# Patient Record
Sex: FEMALE | Race: WHITE | NOT HISPANIC OR LATINO | Employment: UNEMPLOYED | URBAN - METROPOLITAN AREA
[De-identification: names, ages, dates, MRNs, and addresses within clinical notes are randomized per-mention and may not be internally consistent; named-entity substitution may affect disease eponyms.]

---

## 2017-01-11 ENCOUNTER — ALLSCRIPTS OFFICE VISIT (OUTPATIENT)
Dept: OTHER | Facility: OTHER | Age: 37
End: 2017-01-11

## 2017-01-11 LAB — HCG, QUALITATIVE (HISTORICAL): NEGATIVE

## 2017-01-18 ENCOUNTER — GENERIC CONVERSION - ENCOUNTER (OUTPATIENT)
Dept: OTHER | Facility: OTHER | Age: 37
End: 2017-01-18

## 2017-01-19 ENCOUNTER — ALLSCRIPTS OFFICE VISIT (OUTPATIENT)
Dept: OTHER | Facility: OTHER | Age: 37
End: 2017-01-19

## 2017-03-06 ENCOUNTER — ALLSCRIPTS OFFICE VISIT (OUTPATIENT)
Dept: OTHER | Facility: OTHER | Age: 37
End: 2017-03-06

## 2017-03-06 LAB
BACTERIA UR QL AUTO: NEGATIVE
CLUE CELL (HISTORICAL): NORMAL
HYPHAL YEAST (HISTORICAL): NEGATIVE
KOH PREP (HISTORICAL): NORMAL
PH UR STRIP.AUTO: 4 [PH]
TRICHOMONAS (HISTORICAL): NEGATIVE
YEAST (HISTORICAL): NEGATIVE

## 2017-03-08 LAB — CLINICAL COMMENT (HISTORICAL): NORMAL

## 2017-03-22 ENCOUNTER — ALLSCRIPTS OFFICE VISIT (OUTPATIENT)
Dept: OTHER | Facility: OTHER | Age: 37
End: 2017-03-22

## 2018-01-11 NOTE — MISCELLANEOUS
Message  telephone call from patient  States she has had bartholins cyst in past  Thought she was getting another  Completing soaks without improvement  Now describing a "ball with a scab" on labia  Appt  given to evaluate      Active Problems    1  Anemia (285 9) (D64 9)   2  Anemia, normocytic normochromic (285 9) (D64 9)   3  Anxiety (300 00) (F41 9)   4  Constipation (564 00) (K59 00)   5  Contraception (V25 9) (Z30 9)   6  Encounter for initial prescription of injectable contraceptive (V25 02) (Z30 013)   7  Encounter for smoking cessation counseling (V65 42,305 1) (Z71 6,Z72 0)   8  Pregnancy (V22 2) (Z33 1)   9  Tobacco use disorder complicating pregnancy, childbirth, or the puerperium, antepartum   condition or complication (679 53) (L14 367)   10  Ultrasound for  screening for fetal growth restriction (V28 4) (Z36)   11  Underweight (783 22) (R63 6)   12  Urinary tract infection (599 0) (N39 0)    Current Meds   1  MedroxyPROGESTERone Acetate 150 MG/ML Intramuscular Suspension   (Depo-Provera); INJECT EVERY 12 WEEKS AS DIRECTED; Therapy: 07Art2152 to (Last Rx:[id number])  Requested for: 55Vus9414   Ordered   2  Sertraline HCl - 50 MG Oral Tablet (Zoloft); TAKE 1 TABLET DAILY; Last Rx:[id number]   Ordered    Allergies    1  Amoxicillin CAPS   2  Penicillins   3  Vicodin TABS    4  No Known Environmental Allergies   5   No Known Food Allergies    Signatures   Electronically signed by : George Haines RN; 2017  1:34PM EST                       (Author)

## 2018-01-13 VITALS
DIASTOLIC BLOOD PRESSURE: 93 MMHG | SYSTOLIC BLOOD PRESSURE: 127 MMHG | HEART RATE: 79 BPM | BODY MASS INDEX: 17.66 KG/M2 | HEIGHT: 62 IN | WEIGHT: 96 LBS

## 2018-01-14 VITALS
HEART RATE: 78 BPM | BODY MASS INDEX: 17.3 KG/M2 | DIASTOLIC BLOOD PRESSURE: 75 MMHG | SYSTOLIC BLOOD PRESSURE: 107 MMHG | HEIGHT: 62 IN | WEIGHT: 94 LBS

## 2018-01-17 NOTE — PROGRESS NOTES
Assessment    1  Contraception () (Z30 9)    Plan  Contraception    · Belen 0 35 MG Oral Tablet; take one tablet daily at the same time   Rx By: Jumana Jaramillo; Dispense: 0 Days ; #:1 Tablet; Refill: 1; For: Contraception; ERI = N; Verified Transmission to [address]; Last Updated By: System, [employer]; 3/22/2017 11:47:55 AM    Discussion/Summary  Discussion Summary:   29 yo  desires to change from depo to pills, is a smoker will place on POP's  Belen take 1 pill daily at the same time, 1 pack with 1 RF  Reviewed irregular bleeding pattern with POP's  Will start this , reviewed not as effective as Depo and QUIRINO's, may use condoms for BUM  RTO for annual exam, has hx of abnormal pap with no colpo, needs repap  Chief Complaint  Chief Complaint Free Text Note Form: Pt here today to discuss BC change  Pt currently on depo and would like to change to OCP  History of Present Illness  HPI: 29 yo  desires to switch contraception from Depo to pills  She feels she feels tired with Depo and had a lot of fatigue the 1st week after getting her 1st Depo injection 2017  She is due 3/29/2017  Currently she feels some fatigue  She feels she is having her menses start soon  She was tested for GC/CT at last visit which both were negative  She has had same partner since 2016  She is a smoker  We reviewed IUD's, POP's, Depo  She would like to use POP's she is hoping she will feel less fatigued but we reviewed since it is progesterone product she may not, may also be related to her life stresses  Review of Systems   Female ROS: no pelvic pain  Active Problems    1  Anxiety (300 00) (F41 9)   2  ASCUS with positive high risk HPV cervical (795 01,795 05) (R87 610,R87 810)   3  Contraception ( 9) (Z30 9)   4  Encounter for initial prescription of injectable contraceptive ( 02) (Z30 013)   5  Encounter for smoking cessation counseling (B45 42,305  1) (Z71 6,Z72 0)   6  Screening for STDs (sexually transmitted diseases) (V74 5) (Z11 3)   7  Ultrasound for  screening for fetal growth restriction (V28 4) (Z36)   8  Underweight (783 22) (R63 6)   9  Vaginal discharge (623 5) (N89 8)    Past Medical History    1  History of Anxiety disorder (300 00) (F41 9)   2  History of normocytic normochromic anemia (V12 3) (Z86 2)   3  History of pregnancy (V13 29)   4  Normal delivery (650) (O80,Z37 9)   5  History of Reported Pap Smear   6  History of Tobacco use disorder complicating pregnancy, childbirth, or the puerperium,   antepartum condition or complication (656 00) (I66 920)   7  History of Vulvar lump (625 8) (N90 9)    Surgical History    1  History of Cervical Loop Electrosurgical Excision (LEEP)   2  History of Dental Surgery    Family History  Mother    1  No pertinent family history  Father    2  No pertinent family history  Maternal Grandfather    3  Family history of Diabetes Mellitus (V18 0)  Family History    4  Family history of Breast Cancer (V16 3)    Social History    · Denied: History of Alcohol Use (History)   · Current every day smoker (305 1) (F17 200)   · Denied: History of Drug use    Current Meds   1  MedroxyPROGESTERone Acetate 150 MG/ML Intramuscular Suspension; INJECT   EVERY 12 WEEKS AS DIRECTED; Therapy: 49Psa2197 to (Last Rx:[id number])  Requested for: 76Iqb1314 Ordered   2  Sertraline HCl - 50 MG Oral Tablet; TAKE 1 TABLET DAILY; Last Rx:2015 Ordered    Allergies    1  Amoxicillin CAPS   2  Penicillins   3  Vicodin TABS    4  No Known Environmental Allergies   5  No Known Food Allergies    Vitals  Vital Signs    Recorded: 47IUH1677 42:44BF   Systolic 075, LUE, Sitting   Diastolic 79, LUE, Sitting   Height 5 ft 2 in   Weight 96 lb    BMI Calculated 17 56   BSA Calculated 1 4     Physical Exam    Constitutional   General appearance: No acute distress, well appearing and well nourished      Psychiatric   Orientation to person, place, and time: Normal     Mood and affect: Normal        Signatures   Electronically signed by : SINA Rodriguez; Mar 22 2017 12:02PM EST                       (Author)

## 2018-01-22 VITALS
DIASTOLIC BLOOD PRESSURE: 79 MMHG | SYSTOLIC BLOOD PRESSURE: 122 MMHG | HEIGHT: 62 IN | BODY MASS INDEX: 17.66 KG/M2 | WEIGHT: 96 LBS

## 2018-01-30 ENCOUNTER — INITIAL PRENATAL (OUTPATIENT)
Dept: OBGYN CLINIC | Facility: CLINIC | Age: 38
End: 2018-01-30

## 2018-01-31 ENCOUNTER — INITIAL PRENATAL (OUTPATIENT)
Dept: OBGYN CLINIC | Facility: HOSPITAL | Age: 38
End: 2018-01-31
Payer: COMMERCIAL

## 2018-01-31 ENCOUNTER — OB ABSTRACT (OUTPATIENT)
Dept: OBGYN CLINIC | Facility: HOSPITAL | Age: 38
End: 2018-01-31

## 2018-01-31 VITALS — SYSTOLIC BLOOD PRESSURE: 106 MMHG | WEIGHT: 96.2 LBS | BODY MASS INDEX: 17.6 KG/M2 | DIASTOLIC BLOOD PRESSURE: 58 MMHG

## 2018-01-31 DIAGNOSIS — Z3A.08 8 WEEKS GESTATION OF PREGNANCY: Primary | ICD-10-CM

## 2018-01-31 PROCEDURE — T1001 NURSING ASSESSMENT/EVALUATN: HCPCS

## 2018-01-31 RX ORDER — PROPRANOLOL HYDROCHLORIDE 10 MG/1
10 TABLET ORAL AS NEEDED
COMMUNITY
End: 2018-03-12 | Stop reason: ALTCHOICE

## 2018-01-31 NOTE — PROGRESS NOTES
I have reviewed the notes, assessments, and/or procedures performed by RONNY Osullivan, I concur with her/his documentation of John Freeman

## 2018-02-12 ENCOUNTER — ULTRASOUND (OUTPATIENT)
Dept: OBGYN CLINIC | Facility: HOSPITAL | Age: 38
End: 2018-02-12
Payer: COMMERCIAL

## 2018-02-12 VITALS
BODY MASS INDEX: 16.18 KG/M2 | DIASTOLIC BLOOD PRESSURE: 65 MMHG | HEART RATE: 82 BPM | WEIGHT: 97.1 LBS | SYSTOLIC BLOOD PRESSURE: 101 MMHG | HEIGHT: 65 IN

## 2018-02-12 DIAGNOSIS — Z34.90 PRENATAL CARE, ANTEPARTUM: ICD-10-CM

## 2018-02-12 DIAGNOSIS — Z11.3 SCREEN FOR STD (SEXUALLY TRANSMITTED DISEASE): Primary | ICD-10-CM

## 2018-02-12 LAB
PROT UR QL STRIP: NORMAL
SL AMB  POCT GLUCOSE, UA: NORMAL

## 2018-02-12 PROCEDURE — 88141 CYTOPATH C/V INTERPRET: CPT | Performed by: PATHOLOGY

## 2018-02-12 PROCEDURE — 81002 URINALYSIS NONAUTO W/O SCOPE: CPT | Performed by: OBSTETRICS & GYNECOLOGY

## 2018-02-12 PROCEDURE — G0145 SCR C/V CYTO,THINLAYER,RESCR: HCPCS | Performed by: OBSTETRICS & GYNECOLOGY

## 2018-02-12 PROCEDURE — 87624 HPV HI-RISK TYP POOLED RSLT: CPT | Performed by: OBSTETRICS & GYNECOLOGY

## 2018-02-12 PROCEDURE — 87491 CHLMYD TRACH DNA AMP PROBE: CPT | Performed by: OBSTETRICS & GYNECOLOGY

## 2018-02-12 PROCEDURE — 99213 OFFICE O/P EST LOW 20 MIN: CPT | Performed by: OBSTETRICS & GYNECOLOGY

## 2018-02-12 PROCEDURE — 87591 N.GONORRHOEAE DNA AMP PROB: CPT | Performed by: OBSTETRICS & GYNECOLOGY

## 2018-02-12 NOTE — PROGRESS NOTES
ASSESSMENT:  41 y o , [id number], Patient's last menstrual period was 2017 (exact date)  , 10w2d here for PN H&P and ultrasound    PLAN:  #1  PREGNANCY: H&P completed today  PN Labs completed but not available, will request from [facility]  Final dating via LMP  TVUS showed 10w5d fetus  Labor expectations discussed with patient, including appointment schedule, nutrition, weight gain, exercise, medications, sexual intercourse, and nausea/vomiting  #2  ROUTINE SCREENING: Pap collected, GC/CT collected  #3  GENETIC SCREENING: Sequential screening reviewed with patient - will proceed with this  Pt already has app[facility]  Reviewed carrier screening for cystic fibrosis, hemoglobinapathies, and somatic muscular atrophy  Pt desires this  Will coordinate with charge nurse and makes sure there are no financial roadblocks  Please follow up at next visit  General genetic history reviewed  #4  CONSENTS: Delivery process including potential OVD and  reviewed  #5  POSTPARTUM: Patient plans on bottlefeeding  Different methods of contraception were discussed with patient, including progesterone only oral pills, depo provera, nexplanon, mirena, and paragard and premanent sterilization  Patient would like to use bilateral tubal ligation via salpingectomy during postpartum phase or during possible   Will need MA form signed at 28 weeks  #6  FOLLOW UP: RTC in 4 weeks  Precautions regarding labor, leakage, bleeding, and fetal movement reviewed  _________    SUBJECTIVE:  Patient is a 89RI [id number], LMP 17, here for PN H&P  Pregnancy is intended  Currently, pt reports no OB complaints  Patient is currently doing well  She is here with her son and FOB who are in waiting room  FOB is involved  She has a support system at home  She denies hx of sexual, physical, verbal, emotional abuse  She currently does not work  She did compelte her PN labs at [facility]  She will be bottlefeeding    Pt desires genetic and carrier screening for CF/SMA/thallasemias  Pt denies hx of genetic abnormalities in her family or family of FOB  She denies vaginal bleeding, cramping, leakage, abnormal discharge  OBJECTIVE  Vitals:    02/12/18 1105   BP: 101/65   BP Location: Left arm   Pulse: 82   Weight: 44 kg (97 lb 1 6 oz)   Height: 5' 5" (1 651 m)     See PN Physical Exam tab    TVUS 2/12/18  Bedside - see scanned document from 2/12/18  HR: 167bpm  CRL: 10w5d avg  Ovaries: normal, no cysts

## 2018-02-14 ENCOUNTER — OFFICE VISIT (OUTPATIENT)
Dept: PERINATAL CARE | Facility: CLINIC | Age: 38
End: 2018-02-14
Payer: COMMERCIAL

## 2018-02-14 VITALS
SYSTOLIC BLOOD PRESSURE: 115 MMHG | DIASTOLIC BLOOD PRESSURE: 61 MMHG | HEIGHT: 61 IN | BODY MASS INDEX: 18.5 KG/M2 | HEART RATE: 94 BPM | WEIGHT: 98 LBS

## 2018-02-14 DIAGNOSIS — O09.521 MATERNAL AGE > 35, MULTIGRAVIDA, FIRST TRIMESTER: ICD-10-CM

## 2018-02-14 DIAGNOSIS — Z31.5 ENCOUNTER FOR PROCREATIVE GENETIC COUNSELING: Primary | ICD-10-CM

## 2018-02-14 DIAGNOSIS — Z3A.08 8 WEEKS GESTATION OF PREGNANCY: ICD-10-CM

## 2018-02-14 LAB
CHLAMYDIA DNA CVX QL NAA+PROBE: NORMAL
HPV RRNA GENITAL QL NAA+PROBE: ABNORMAL
N GONORRHOEA DNA GENITAL QL NAA+PROBE: NORMAL

## 2018-02-14 PROCEDURE — 99214 OFFICE O/P EST MOD 30 MIN: CPT | Performed by: GENETIC COUNSELOR, MS

## 2018-02-14 NOTE — PROGRESS NOTES
Genetic Counseling Note        John Freeman     Appointment Date:  2/14/2018  Referred By: Mary Frey MD  YOB: 1980  Partner:  Christin Late    Pregnancy History: [id number]  Estimated Date of Delivery: 9/8/18  Estimated Gestational Age: 9 weeks 4 days     Genetic Counseling:advanced maternal age    Jose G Fulton is a(n) 41 y o  female who is here to discuss maternal age related risk for aneuploidy    Issues Discussed:  Average population risk: 3-4% in the average pregnancy of serious condition or birth defect  2-3% risk of mental retardation  Not all detected by prenatal testing  Chromosomal: non-disjunction 1/307 risk for Down syndrome, 1/148 risk overall  Maternal age  Risk of aneuploidy    Options Discussed:  Amniocentesis: risks and limitations discussed  CVS: risks and limitations discussed  Ethnic screening discussed: clinical and genetic basis of CF, SMA, Fragile X and hemoglobinopathies  Level II ultrasound to screen for structural anomalies  Nuchal translucency/1st trimester serum screen: goals and limitations discussed  Serum AFP screen recommended at 15-17 weeks to check for open neural tube defects  Cell free fetal DNA testing    Additional Information / Impression / Plan / Tests Ordered:  Patient seen for genetic counseling to discuss maternal age related risk for aneuploidy  Counseling was somewhat difficult due to the presence of her 3year-old son  After discussing the risks and benefits of the available prenatal diagnostic and screening procedures Corinne elected to decline prenatal diagnostic testing at this time  She is planning to further consider testing options but will likely pursue sequential screening when she returns for nuchal translucency ultrasound evaluation  She is also planning to pursue level 2 ultrasound evaluation at the appropriate time      During our counseling session histories were taken on the patient's family and her partner's family both of which were negative for any prior known cases of intellectual disability, birth defects or single gene disorders  The patient reports being of [place] descent and that her partner is of [place] and  descent (not otherwise specified)  She denies either of them having any known Quaker ancestry  Carrier screening for CF, SMA, Fragile X and hemoglobinopathies was discussed  Celina Space indicated is her understanding that screening for CF, SMA and hemoglobinopathies will be done by her obstetrician  She elected to decline genetic carrier screening for Fragile X  Lastly, we discussed the fact that it is important to keep in mind that everyone in the general population regardless of age, family history, or medical background has approximately a 3% risk of having a child with some type of her defect, genetic disease or intellectual disability  Currently there are no tests available to rule out all birth defects or health problems            Time spent with Genetic Counselor: 45 minutes

## 2018-02-16 PROBLEM — Z3A.10 10 WEEKS GESTATION OF PREGNANCY: Status: ACTIVE | Noted: 2018-02-16

## 2018-02-16 LAB
LAB AP GYN PRIMARY INTERPRETATION: NORMAL
Lab: NORMAL
PATH INTERP SPEC-IMP: NORMAL

## 2018-02-22 ENCOUNTER — TELEPHONE (OUTPATIENT)
Dept: OBGYN CLINIC | Facility: HOSPITAL | Age: 38
End: 2018-02-22

## 2018-02-26 ENCOUNTER — ROUTINE PRENATAL (OUTPATIENT)
Dept: PERINATAL CARE | Facility: CLINIC | Age: 38
End: 2018-02-26
Payer: COMMERCIAL

## 2018-02-26 VITALS
WEIGHT: 100.8 LBS | HEART RATE: 83 BPM | SYSTOLIC BLOOD PRESSURE: 99 MMHG | DIASTOLIC BLOOD PRESSURE: 63 MMHG | HEIGHT: 61 IN | BODY MASS INDEX: 19.03 KG/M2

## 2018-02-26 DIAGNOSIS — O09.521 ELDERLY MULTIGRAVIDA IN FIRST TRIMESTER: Primary | ICD-10-CM

## 2018-02-26 DIAGNOSIS — Z3A.12 12 WEEKS GESTATION OF PREGNANCY: ICD-10-CM

## 2018-02-26 DIAGNOSIS — Z36.82 ENCOUNTER FOR NUCHAL TRANSLUCENCY TESTING: ICD-10-CM

## 2018-02-26 PROBLEM — R87.610 ASCUS WITH POSITIVE HIGH RISK HPV CERVICAL: Status: ACTIVE | Noted: 2017-03-06

## 2018-02-26 PROBLEM — R87.810 ASCUS WITH POSITIVE HIGH RISK HPV CERVICAL: Status: ACTIVE | Noted: 2017-03-06

## 2018-02-26 PROCEDURE — 76813 OB US NUCHAL MEAS 1 GEST: CPT | Performed by: OBSTETRICS & GYNECOLOGY

## 2018-02-26 PROCEDURE — 76801 OB US < 14 WKS SINGLE FETUS: CPT | Performed by: OBSTETRICS & GYNECOLOGY

## 2018-02-26 PROCEDURE — 99212 OFFICE O/P EST SF 10 MIN: CPT | Performed by: OBSTETRICS & GYNECOLOGY

## 2018-02-26 NOTE — PATIENT INSTRUCTIONS
How to Stop Smoking   AMBULATORY CARE:   You will improve your health and the health of others around you  if you stop smoking  Your risk for heart and lung disease, cancer, stroke, heart attack, and vision problems will also decrease  You can benefit from quitting no matter how long you have smoked  Prepare to stop smoking:  Nicotine is a highly addictive drug found in cigarettes  Withdrawal symptoms can happen when you stop smoking and make it hard to quit  These include anxiety, depression, irritability, trouble sleeping, and increased appetite  You increase your chances of success if you prepare to quit  · Set a quit date  John Nim a date that is within the next 2 weeks  Do not pick a day that you think may be stressful or busy  Write down the day or [place] it on your calender  · Tell friends and family that you plan to quit  Explain that you may have withdrawal symptoms when you try to quit  Ask them to support you  They may be able to encourage you and help reduce your stress to make it easier for you to quit  · Make a list of your reasons for quitting  Put the list somewhere you will see it every day, such as your refrigerator  You can look at the list when you have a craving  · Remove all tobacco and nicotine products from your home, car, and workplace  Also, remove anything else that will tempt you to smoke, such as lighters, matches, or ashtrays  Clean your car, home, and places at work that smell like smoke  The smell of smoke can trigger a craving  · Identify triggers that make you want to smoke  This may include activities, feelings, or people  Also write down 1 way you can deal with each of your triggers  For example, if you want to smoke as soon as you wake up, plan another activity during this time, such as exercise  · Make a plan for how you will quit  Learn about the tools that can help you quit, such as medicine, counseling, or nicotine replacement therapy   Choose at least 2 options to help you quit  Tools to help you stop smoking:   · Counseling  from a trained healthcare provider can provide you with support and skills to quit smoking  The provider will also teach you to manage your withdrawal symptoms and cravings  You may receive counseling from one counselor, in group therapy, or through phone therapy called a quit line  · Nicotine replacement therapy (NRT)  such as nicotine patches, gum, or lozenges may help reduce your nicotine cravings  You may get these without a doctor's order  Do not use e-cigarettes or smokeless tobacco in place of cigarettes or to help you quit  They still contain nicotine  · Prescription medicines  such as nasal sprays or nicotine inhalers may help reduce your withdrawal symptoms  Other medicines may also be used to reduce your urge to smoke  Ask your healthcare provider about these medicines  You may need to start certain medicines 2 weeks before your quit date for them to work well  · Hypnosis  is a practice that helps guide you through thoughts and feelings  Hypnosis may help decrease your cravings and make you more willing to quit  · Acupuncture therapy  uses very thin needles to balance energy channels in the body  This is thought to help decrease cravings and symptoms of nicotine withdrawal      · Support groups  let you talk to others who are trying to quit or have already quit  It may be helpful to speak with others about how they quit  Manage your cravings:   · Avoid situations, people, and places that tempt you to smoke  Go to nonsmoking places, such as libraries or restaurants  Understand what tempts you and try to avoid these things  · Keep your hands busy  Hold things such as a stress ball or pen  · Put candy or toothpicks in your mouth  Keep lollipops, sugarless gum, or toothpicks with you at all times  · Do not have alcohol or caffeine  These drinks may tempt you to smoke   Drink healthy liquids such as water or juice instead  · Reward yourself when you resist your cravings  Rewards will motivate you and help you stay positive  · Do an activity that distracts you from your craving  Examples include going for a walk, exercising, or cleaning  Prevent weight gain after you quit:  You may gain a few pounds after you quit smoking  It is healthier for you to gain a few pounds than to continue to smoke  The following can help you prevent weight gain:  · Eat healthy foods  These include fruits, vegetables, whole-grain breads, low-fat dairy products, beans, lean meats, and fish  Eat healthy snacks, such as low-fat yogurt, if you get hungry between meals  · Drink water before, during, and between meals  This will make your stomach feel full and help prevent you from overeating  Ask your healthcare provider how much liquid to drink each day and which liquids are best for you  · Exercise  Take a walk or do some kind of exercise every day  Ask your healthcare provider what exercise is right for you  This may help reduce your cravings and reduce stress  For more support and information:   · Smokefree  [employer]  Phone: 1- 594 - 930-5038  Web Address: www smokefree  gov  © 2017 [address] Information is for End User's use only and may not be sold, redistributed or otherwise used for commercial purposes  All illustrations and images included in CareNotes® are the copyrighted property of [employer]  or [address]  The above information is an  only  It is not intended as medical advice for individual conditions or treatments  Talk to your doctor, nurse or pharmacist before following any medical regimen to see if it is safe and effective for you

## 2018-02-26 NOTE — PROGRESS NOTES
[address]: Ms Dennis Barragan was seen today at 12w2d for nuchal translucency ultrasound  See ultrasound report under "OB Procedures" tab    Please don't hesitate to contact our office with any concerns or questions   -Dontae Deras MD

## 2018-03-12 ENCOUNTER — ROUTINE PRENATAL (OUTPATIENT)
Dept: OBGYN CLINIC | Facility: HOSPITAL | Age: 38
End: 2018-03-12
Payer: COMMERCIAL

## 2018-03-12 VITALS — WEIGHT: 102 LBS | SYSTOLIC BLOOD PRESSURE: 105 MMHG | BODY MASS INDEX: 19.27 KG/M2 | DIASTOLIC BLOOD PRESSURE: 63 MMHG

## 2018-03-12 DIAGNOSIS — Z3A.12 12 WEEKS GESTATION OF PREGNANCY: ICD-10-CM

## 2018-03-12 PROCEDURE — 99212 OFFICE O/P EST SF 10 MIN: CPT | Performed by: OBSTETRICS & GYNECOLOGY

## 2018-03-12 NOTE — PROGRESS NOTES
1  Patient has no OB complaints  2  Ascus pap and other HR HPV positive: patient does not desire to get colposcopy done today   She will follow up in 4 weeks for PN and colposocpy

## 2018-04-09 ENCOUNTER — ROUTINE PRENATAL (OUTPATIENT)
Dept: OBGYN CLINIC | Facility: HOSPITAL | Age: 38
End: 2018-04-09
Payer: COMMERCIAL

## 2018-04-09 VITALS
WEIGHT: 102.4 LBS | HEART RATE: 110 BPM | BODY MASS INDEX: 19.33 KG/M2 | HEIGHT: 61 IN | SYSTOLIC BLOOD PRESSURE: 113 MMHG | DIASTOLIC BLOOD PRESSURE: 57 MMHG

## 2018-04-09 DIAGNOSIS — Z34.92 PRENATAL CARE IN SECOND TRIMESTER: ICD-10-CM

## 2018-04-09 DIAGNOSIS — O99.332 TOBACCO USE AFFECTING PREGNANCY IN SECOND TRIMESTER, ANTEPARTUM: Primary | ICD-10-CM

## 2018-04-09 PROBLEM — Z3A.18 18 WEEKS GESTATION OF PREGNANCY: Status: ACTIVE | Noted: 2018-02-16

## 2018-04-09 PROCEDURE — 99213 OFFICE O/P EST LOW 20 MIN: CPT | Performed by: NURSE PRACTITIONER

## 2018-04-09 NOTE — PROGRESS NOTES
Denies loss of fluid, vaginal bleeding and abdominal pain  Confirms fetal movement, occasional flutters  Tolerating prenatal vitamin while  Declines genetic screening,   Please see note from genetic counselor  Patient aware she needs colposcopy post delivery  Currently every day smoker approximately 1 pack last 3 days  Encouraged smoking cessation  Has appointment for level 2 ultrasound in 2 weeks  No concerns today  PLAN:   1  Continue prenatal vitamin daily  2  Smoking cessation encouraged, written information provided  3   Center level 2 ultrasound as scheduled  4  Common discomforts of pregnancy and precautions reviewed    RTO 4 weeks

## 2018-04-09 NOTE — PATIENT INSTRUCTIONS
Pregnancy at 23 to 24 [address]:   What changes are happening in my body? Now that you are in your second trimester, you have more energy  You may also be feeling hungrier than usual  You may be gaining about ½ to 1 pound a week, and your pregnancy is beginning to show  You may need to start wearing maternity clothes  As your baby gets larger, you may have other symptoms  These may include body aches or stretch marks on your abdomen, breasts, thighs, or buttocks  How do I care for myself at this stage of my pregnancy? · Eat a variety of healthy foods  Healthy foods include fruits, vegetables, whole-grain breads, low-fat dairy foods, beans, lean meats, and fish  Drink liquids as directed  Ask how much liquid to drink each day and which liquids are best for you  Limit caffeine to less than 200 milligrams each day  Limit your intake of fish to 2 servings each week  Choose fish low in mercury such as canned light tuna, shrimp, salmon, cod, or tilapia  Do not  eat fish high in mercury such as swordfish, tilefish, maverick mackerel, and shark  · Take prenatal vitamins as directed  Your need for certain vitamins and minerals, such as folic acid, increases during pregnancy  Prenatal vitamins provide some of the extra vitamins and minerals you need  Prenatal vitamins may also help to decrease the risk of certain birth defects  · Talk to your healthcare provider about exercise  Moderate exercise can help you stay fit  Your healthcare provider will help you plan an exercise program that is safe for you during pregnancy  · Do not smoke  If you smoke, it is never too late to quit  Smoking increases your risk of a miscarriage and other health problems during your pregnancy  Smoking can cause your baby to be born too early or weigh less at birth  Ask your healthcare provider for information if you need help quitting  · Do not drink alcohol    Alcohol passes from your body to your baby through the placenta  It can affect your baby's brain development and cause fetal alcohol syndrome (FAS)  FAS is a group of conditions that causes mental, behavior, and growth problems  · Talk to your healthcare provider before you take any medicines  Many medicines may harm your baby if you take them when you are pregnant  Do not take any medicines, vitamins, herbs, or supplements without first talking to your healthcare provider  Never use illegal or street drugs (such as marijuana or cocaine) while you are pregnant  What are some safety tips during pregnancy? · Avoid hot tubs and saunas  Do not use a hot tub or sauna while you are pregnant, especially during your first trimester  Hot tubs and saunas may raise your baby's temperature and increase the risk of birth defects  · Avoid toxoplasmosis  This is an infection caused by eating raw meat or being around infected cat feces  It can cause birth defects, miscarriages, and other problems  Wash your hands after you touch raw meat  Make sure any meat is well-cooked before you eat it  Avoid raw eggs and unpasteurized milk  Use gloves or ask someone else to clean your cat's litter box while you are pregnant  What changes are happening with my baby? By 22 weeks, your baby is about 8 inches long from the top of the head to the rump (baby's bottom)  Your baby also weighs about 1 pound  Your baby is becoming much more active  You may be able to feel the baby move inside you now  The first movements may not be that noticeable  They may feel like a fluttering sensation  As time goes on, your baby's movements will become stronger and more noticeable  What do I need to know about prenatal care? During the first 28 weeks of your pregnancy, you will see your healthcare provider once a month  Your healthcare provider will check your blood pressure and weight  You may also need the following:  · A urine test  may also be done to check for sugar and protein   These can be signs of gestational diabetes or infection  Protein in your urine may also be a sign of preeclampsia  Preeclampsia is a condition that can develop during week 20 or later of your pregnancy  It causes high blood pressure, and it can cause problems with your kidneys and other organs  · Fundal height  is a measurement of your uterus to check your baby's growth  This number is usually the same as the number of weeks that you have been pregnant  · A fetal ultrasound  shows pictures of your baby inside your uterus  It shows your baby's development  The movement and position of your baby can also be seen  Your healthcare provider may be able to tell you what your baby's gender is during the ultrasound  · Your baby's heart rate  will be checked  When should I seek immediate care? · You develop a severe headache that does not go away  · You have new or increased vision changes, such as blurred or spotted vision  · You have new or increased swelling in your face or hands  · You have vaginal spotting or bleeding  · Your water broke or you feel warm water gushing or trickling from your vagina  When should I contact my healthcare provider? · You have abdominal cramps, pressure, or tightening  · You have a change in vaginal discharge  · You cannot keep food or drinks down, and you are losing weight  · You have chills or a fever  · You have vaginal itching, burning, or pain  · You have yellow, green, white, or foul-smelling vaginal discharge  · You have pain or burning when you urinate, less urine than usual, or pink or bloody urine  · You have questions or concerns about your condition or care  CARE AGREEMENT:   You have the right to help plan your care  Learn about your health condition and how it may be treated  Discuss treatment options with your caregivers to decide what care you want to receive  You always have the right to refuse treatment   The above information is an  only  It is not intended as medical advice for individual conditions or treatments  Talk to your doctor, nurse or pharmacist before following any medical regimen to see if it is safe and effective for you  © 2017 [address] [employer] Information is for End User's use only and may not be sold, redistributed or otherwise used for commercial purposes  All illustrations and images included in CareNotes® are the copyrighted property of [employer]  or [employer]  Cigarette Smoking and Your Health   AMBULATORY CARE:   Risks to your health if you smoke:  Nicotine and other chemicals found in tobacco damage every cell in your body  Even if you are a light smoker, you have an increased risk for cancer, heart disease, and lung disease  If you are pregnant or have diabetes, smoking increases your risk for complications  Benefits to your health if you stop smoking:   · You decrease respiratory symptoms such as coughing, wheezing, and shortness of breath  · You reduce your risk for cancers of the lung, mouth, throat, kidney, bladder, pancreas, stomach, and cervix  If you already have cancer, you increase the benefits of chemotherapy  You also reduce your risk for cancer returning or a second cancer from developing  · You reduce your risk for heart disease, blood clots, heart attack, and stroke  · You reduce your risk for lung infections, and diseases such as pneumonia, asthma, chronic bronchitis, and emphysema  · Your circulation improves  More oxygen can be delivered to your body  If you have diabetes, you lower your risk for complications, such as kidney, artery, and eye diseases  You also lower your risk for nerve damage  Nerve damage can lead to amputations, poor vision, and blindness  · You improve your body's ability to heal and to fight infections    Benefits to the health of others if you stop smoking:  Tobacco is harmful to nonsmokers who breathe in your secondhand smoke  The following are ways the health of others around you may improve when you stop smoking:  · You lower the risks for lung cancer and heart disease in nonsmoking adults  · If you are pregnant, you lower the risk for miscarriage, early delivery, low birth weight, and stillbirth  You also lower your baby's risk for SIDS, obesity, developmental delay, and neurobehavioral problems, such as ADHD  · If you have children, you lower their risk for ear infections, colds, pneumonia, bronchitis, and asthma  For more information and support to stop smoking:   · [employer]  Phone: 3- 230 - 076-2632  Web Address: www [employer]  Follow up with your healthcare provider as directed:  Write down your questions so you remember to ask them during your visits  © 2017 [address] Information is for End User's use only and may not be sold, redistributed or otherwise used for commercial purposes  All illustrations and images included in CareNotes® are the copyrighted property of [employer]  or Nhan Miller  The above information is an  only  It is not intended as medical advice for individual conditions or treatments  Talk to your doctor, nurse or pharmacist before following any medical regimen to see if it is safe and effective for you

## 2018-04-11 ENCOUNTER — DOCUMENTATION (OUTPATIENT)
Dept: PERINATAL CARE | Facility: CLINIC | Age: 38
End: 2018-04-11

## 2018-04-11 NOTE — PROGRESS NOTES
No record at [facility] pt completed part 1 of sequential screen  Message sent to Terrance Ley NP at [facility] pt did not complete and declined NIPT testing when she had initial U/S at [facility]

## 2018-04-13 ENCOUNTER — TELEPHONE (OUTPATIENT)
Dept: OBGYN CLINIC | Facility: HOSPITAL | Age: 38
End: 2018-04-13

## 2018-04-13 NOTE — TELEPHONE ENCOUNTER
----- Message from [facility] [address] sent at 4/11/2018 12:58 PM EDT -----  Regarding: FW: sequential screen   Please call patient and offer her quad screen  She should get this done for best value within the next few days  Thank you    ----- Message -----  From: Santhosh Salas RN  Sent: 4/11/2018  12:19 PM  To: SINA Lopez  Subject: sequential screen                                Just fyi-pt did not complete part 1 of sequential screen  She will need a quad screen if you want testing  Pt declined NIPT testing for AMA  See M u/s report if questions       Beth Godinez RN [facility]

## 2018-04-23 ENCOUNTER — ROUTINE PRENATAL (OUTPATIENT)
Dept: PERINATAL CARE | Facility: CLINIC | Age: 38
End: 2018-04-23
Payer: COMMERCIAL

## 2018-04-23 VITALS
HEIGHT: 61 IN | BODY MASS INDEX: 18.98 KG/M2 | SYSTOLIC BLOOD PRESSURE: 104 MMHG | WEIGHT: 100.5 LBS | DIASTOLIC BLOOD PRESSURE: 62 MMHG | HEART RATE: 92 BPM

## 2018-04-23 DIAGNOSIS — O44.40 LOW-LYING PLACENTA: ICD-10-CM

## 2018-04-23 DIAGNOSIS — O09.522 AMA (ADVANCED MATERNAL AGE) MULTIGRAVIDA 35+, SECOND TRIMESTER: ICD-10-CM

## 2018-04-23 DIAGNOSIS — O99.332 TOBACCO SMOKING AFFECTING PREGNANCY IN SECOND TRIMESTER: ICD-10-CM

## 2018-04-23 DIAGNOSIS — Z3A.20 20 WEEKS GESTATION OF PREGNANCY: ICD-10-CM

## 2018-04-23 DIAGNOSIS — Z36.86 ENCOUNTER FOR ANTENATAL SCREENING FOR CERVICAL LENGTH: ICD-10-CM

## 2018-04-23 DIAGNOSIS — Z36.3 ENCOUNTER FOR ANTENATAL SCREENING FOR MALFORMATIONS: Primary | ICD-10-CM

## 2018-04-23 PROCEDURE — 76817 TRANSVAGINAL US OBSTETRIC: CPT | Performed by: OBSTETRICS & GYNECOLOGY

## 2018-04-23 PROCEDURE — 76811 OB US DETAILED SNGL FETUS: CPT | Performed by: OBSTETRICS & GYNECOLOGY

## 2018-04-23 PROCEDURE — 99212 OFFICE O/P EST SF 10 MIN: CPT | Performed by: OBSTETRICS & GYNECOLOGY

## 2018-04-23 NOTE — PROGRESS NOTES
[address]: Ms Angelica Estrada was seen today at 20w2d for anatomic survey and cervical length screening ultrasound  See ultrasound report under "OB Procedures" tab  Please don't hesitate to contact our office with any concerns or questions    Rosina Leiva MD

## 2018-04-23 NOTE — PATIENT INSTRUCTIONS
Thank you for choosing [facility] for your  care today  If you have any questions about your ultrasound or care, please do not hesitate to contact us or your primary obstetrician  Please return in 8 weeks for your next ultrasound to check on the fetal growth and low-lying placenta  Please continue to try to cut down on tobacco use as this will help you and baby

## 2018-04-26 ENCOUNTER — TELEPHONE (OUTPATIENT)
Dept: OBGYN CLINIC | Facility: CLINIC | Age: 38
End: 2018-04-26

## 2018-04-26 NOTE — TELEPHONE ENCOUNTER
[facility] at [employer] needs a diagnosis code on pt for bloodwork, pls call with this, thanks

## 2018-05-07 ENCOUNTER — ROUTINE PRENATAL (OUTPATIENT)
Dept: OBGYN CLINIC | Facility: HOSPITAL | Age: 38
End: 2018-05-07
Payer: COMMERCIAL

## 2018-05-07 VITALS
DIASTOLIC BLOOD PRESSURE: 59 MMHG | SYSTOLIC BLOOD PRESSURE: 99 MMHG | WEIGHT: 103.4 LBS | BODY MASS INDEX: 19.52 KG/M2 | HEART RATE: 87 BPM | HEIGHT: 61 IN

## 2018-05-07 DIAGNOSIS — O99.332 TOBACCO USE AFFECTING PREGNANCY IN SECOND TRIMESTER, ANTEPARTUM: ICD-10-CM

## 2018-05-07 DIAGNOSIS — Z34.92 PRENATAL CARE IN SECOND TRIMESTER: Primary | ICD-10-CM

## 2018-05-07 DIAGNOSIS — Z3A.22 22 WEEKS GESTATION OF PREGNANCY: ICD-10-CM

## 2018-05-07 PROCEDURE — 99213 OFFICE O/P EST LOW 20 MIN: CPT | Performed by: NURSE PRACTITIONER

## 2018-05-07 RX ORDER — NICOTINE 21 MG/24HR
1 PATCH, TRANSDERMAL 24 HOURS TRANSDERMAL EVERY 24 HOURS
Qty: 28 PATCH | Refills: 1 | Status: SHIPPED | OUTPATIENT
Start: 2018-05-07 | End: 2018-06-25

## 2018-05-07 NOTE — PATIENT INSTRUCTIONS
Colposcopy   WHAT YOU NEED TO KNOW:   What do I need to know about a colposcopy? A colposcopy is a procedure to look at your cervix and inside your vagina  You may need a colposcopy if you have any of the following:  · Abnormal cells found on a pap smear    · Abnormal bleeding    · Pelvic pain    · Genital warts    · Have been treated for problems on your cervix or in your vagina  How do I prepare for a colposcopy? Your healthcare provider will talk to you about how to prepare for your procedure  Do not douche, use tampons, or have sex for 24 hours before the procedure  Do not put medicines in your vagina for 24 hours before the procedure  Call your healthcare provider if you have your menstrual period on the day of the procedure  You may need to wait until your period ends to have the procedure  Your healthcare provider may tell you take ibuprofen before your procedure to decrease cramping  What will happen during a colposcopy? · Your healthcare provider will insert a speculum in your vagina  A speculum is an instrument that holds the vagina open so your provider can see your cervix  The provider will place a colposcope near the opening to your vagina  A colposcope has a magnifying lens that helps your provider see your cervix better  The provider will apply a liquid to your cervix  The liquid helps your provider see abnormal areas more clearly  You may feel burning or tingling when the liquid is applied  · If abnormal areas are seen, your healthcare provider may place a numbing medicine on your cervix  He or she will take samples of tissue from your cervix (a biopsy)  The samples can be sent to a lab and tested for cancer or infection  You may feel pain or cramping when the sample is taken  Your healthcare provider may place medicine on your cervix to control bleeding  What will happen after a colposcopy? You may have light bleeding or spotting after the procedure   If a biopsy was taken, you may have cramping and bleeding for several days  If medicine was used to control bleeding, you may have brown or black discharge  What are the risks of a colposcopy? You may bleed more than expected or get an infection  A biopsy of your cervix may cause problems with future pregnancies, such as  labor or miscarriage  CARE AGREEMENT:   You have the right to help plan your care  Learn about your health condition and how it may be treated  Discuss treatment options with your caregivers to decide what care you want to receive  You always have the right to refuse treatment  The above information is an  only  It is not intended as medical advice for individual conditions or treatments  Talk to your doctor, nurse or pharmacist before following any medical regimen to see if it is safe and effective for you  ©  [address] [employer]  Information is for End User's use only and may not be sold, redistributed or otherwise used for commercial purposes  All illustrations and images included in CareNotes® are the copyrighted property of A D A M , Inc  or [employer]  Pregnancy at 23 to 26 [address]:   What changes are happening in my body? You are now close to or at the beginning of the third trimester  The third trimester starts at 24 weeks and ends with delivery  As your baby gets larger, you may develop certain symptoms  These may include pain in your back or down the sides of your abdomen  You may also have stretch marks on your abdomen, breasts, thighs, or buttocks  You may also have constipation  How do I care for myself at this stage of my pregnancy? · Eat a variety of healthy foods  Healthy foods include fruits, vegetables, whole-grain breads, low-fat dairy foods, beans, lean meats, and fish  Drink liquids as directed  Ask how much liquid to drink each day and which liquids are best for you  Limit caffeine to less than 200 milligrams each day   Limit your intake of fish to 2 servings each week  Choose fish low in mercury such as canned light tuna, shrimp, salmon, cod, or tilapia  Do not  eat fish high in mercury such as swordfish, tilefish, maverick mackerel, and shark  · Manage back pain  Do not stand for long periods of time or lift heavy items  Use good posture while you stand, squat, or bend  Wear low-heeled shoes with good support  Rest may also help to relieve back pain  Ask your healthcare provider about exercises you can do to strengthen your back muscles  · Take prenatal vitamins as directed  Your need for certain vitamins and minerals, such as folic acid, increases during pregnancy  Prenatal vitamins provide some of the extra vitamins and minerals you need  Prenatal vitamins may also help to decrease the risk of certain birth defects  · Talk to your healthcare provider about exercise  Moderate exercise can help you stay fit  Your healthcare provider will help you plan an exercise program that is safe for you during pregnancy  · Do not smoke  If you smoke, it is never too late to quit  Smoking increases your risk of a miscarriage and other health problems during your pregnancy  Smoking can cause your baby to be born too early or weigh less at birth  Ask your healthcare provider for information if you need help quitting  · Do not drink alcohol  Alcohol passes from your body to your baby through the placenta  It can affect your baby's brain development and cause fetal alcohol syndrome (FAS)  FAS is a group of conditions that causes mental, behavior, and growth problems  · Talk to your healthcare provider before you take any medicines  Many medicines may harm your baby if you take them when you are pregnant  Do not take any medicines, vitamins, herbs, or supplements without first talking to your healthcare provider  Never use illegal or street drugs (such as marijuana or cocaine) while you are pregnant    What are some safety tips during pregnancy? · Avoid hot tubs and saunas  Do not use a hot tub or sauna while you are pregnant, especially during your first trimester  Hot tubs and saunas may raise your baby's temperature and increase the risk of birth defects  · Avoid toxoplasmosis  This is an infection caused by eating raw meat or being around infected cat feces  It can cause birth defects, miscarriages, and other problems  Wash your hands after you touch raw meat  Make sure any meat is well-cooked before you eat it  Avoid raw eggs and unpasteurized milk  Use gloves or ask someone else to clean your cat's litter box while you are pregnant  What changes are happening with my baby? By 26 weeks, your baby will weigh about 2 pounds  Your baby will be about 10 inches long from the top of the head to the rump (baby's bottom)  Your baby's movements are much stronger now  Your baby's eyes are almost completely formed and can partially open  Your baby also sleeps and wakes up  What do I need to know about prenatal care? Your healthcare provider will check your blood pressure and weight  You may also need the following:  · A urine test  may also be done to check for sugar and protein  These can be signs of gestational diabetes or infection  Protein in your urine may also be a sign of preeclampsia  Preeclampsia is a condition that can develop during week 20 or later of your pregnancy  It causes high blood pressure, and it can cause problems with your kidneys and other organs  · Fundal height  is a measurement of your uterus to check your baby's growth  This number is usually the same as the number of weeks that you have been pregnant  · Your baby's heart rate  will be checked  When should I seek immediate care? · You develop a severe headache that does not go away  · You have new or increased vision changes, such as blurred or spotted vision  · You have new or increased swelling in your face or hands      · You have vaginal spotting or bleeding  · Your water broke or you feel warm water gushing or trickling from your vagina  When should I contact my healthcare provider? · You have abdominal cramps, pressure, or tightening  · You have a change in vaginal discharge  · You have light bleeding  · You have chills or a fever  · You have vaginal itching, burning, or pain  · You have yellow, green, white, or foul-smelling vaginal discharge  · You have pain or burning when you urinate, less urine than usual, or pink or bloody urine  · You have questions or concerns about your condition or care  CARE AGREEMENT:   You have the right to help plan your care  Learn about your health condition and how it may be treated  Discuss treatment options with your caregivers to decide what care you want to receive  You always have the right to refuse treatment  The above information is an  only  It is not intended as medical advice for individual conditions or treatments  Talk to your doctor, nurse or pharmacist before following any medical regimen to see if it is safe and effective for you  © 2017 [address] [employer]  Information is for End User's use only and may not be sold, redistributed or otherwise used for commercial purposes  All illustrations and images included in CareNotes® are the copyrighted property of A D A M , Inc  or [employer]  Nicotine (Absorbed through the skin)   Nicotine (EVE-oh-teen)  Helps you quit smoking  Brand Name(s): [facility] Nicotine Transdermal System, [employer] Nicotine Transdermal System, [employer] Nicotine Transdermal System, Nicoderm CQ, Nicoderm CQ Clear, Nicotrol, [employer] Nicotine, [employer] Nicotine Transdermal System   There may be other brand names for this medicine  When This Medicine Should Not Be Used: This medicine is not right for everyone  Do not use it if you had an allergic reaction to nicotine    How to Use This Medicine:   Patch  · Follow the instructions on the medicine label if you are using this medicine without a prescription  · Read and follow the patient instructions that come with this medicine  Talk to your doctor or pharmacist if you have any questions  · Wash your hands with soap and water before and after applying a patch  · Leave the patch in its sealed wrapper until you are ready to put it on  Tear the wrapper open carefully  NEVER CUT the wrapper or the patch with scissors  Do not use any patch that has been cut by accident  · NicoDerm® CQ:   ¨ This is a 3-step program  If you smoke more than 10 cigarettes per day, start with step 1 followed by step 2 and step 3  If you smoke 10 or fewer cigarettes per day, start with step 2 followed by step 3  ¨ Begin using the patch on the morning of your quit day, even if you are not able to stop smoking immediately  ¨ Apply the patch at about the same time every day to skin that is clean, dry, and free of hair  ¨ The patient instructions will show the body areas where you can wear the patch  When putting on each new patch, choose a different place within these areas  Do not put the new patch on the same place you wore the last one  Be sure to remove the old patch before applying a new one  ¨ Do not put the patch over irritated skin  Do not use creams or lotions, including sunscreen, on the skin where you apply the patch, because it may not stick well  ¨ Apply a new patch if one falls off  ¨ If you have vivid dreams or sleep problems, remove the patch at bedtime and apply a new one in the morning  · Keep using this medicine for the full treatment time  If you feel you need to use this medicine for a longer period of time, talk to your doctor  · Store the patches at room temperature in a closed container, away from heat, moisture, and direct light  · Fold the used patch in half with the sticky sides together  Throw any used patch away so that children or pets cannot get to it   You will also need to throw away old patches after the expiration date has passed  Drugs and Foods to Avoid:      Ask your doctor or pharmacist before using any other medicine, including over-the-counter medicines, vitamins, and herbal products  Warnings While Using This Medicine:   · Pregnant or breastfeeding women should only use this medicine as directed by a doctor  Smoking can seriously harm your unborn child  Try to stop smoking without using medicine  Although this medicine is believed to be safer than smoking, the risks of its use during pregnancy are not fully known  · Tell your doctor if you have heart disease, heart rhythm problems, high blood pressure, or you had a recent heart attack  Tell your doctor if you have an allergy to adhesive tape  · This medicine may cause the following problems:  ¨ High blood pressure  ¨ Increase in heart rate  · The opaque NicoDerm® CQ patch may cause skin burns if you have a procedure called a magnetic resonance imaging (MRI) scan  You must remove the patch before an MRI  · Keep all medicine out of the reach of children  Never share your medicine with anyone  Possible Side Effects While Using This Medicine:   Call your doctor right away if you notice any of these side effects:  · Allergic reaction: Itching or hives, swelling in your face or hands, swelling or tingling in your mouth or throat, chest tightness, trouble breathing  · Dizziness, headache, upset stomach, drooling, vomiting, diarrhea, cold sweats, blurred vision, trouble hearing, confusion, fainting, or weakness  · Fast, slow, pounding, or uneven heartbeat  If you notice these less serious side effects, talk with your doctor:   · Mild skin redness, itching, burning, or tingling where you wear the patch  · Vivid dreams or trouble sleeping  If you notice other side effects that you think are caused by this medicine, tell your doctor  Call your doctor for medical advice about side effects   You may report side effects to FDA at 2-151-FDA-8728  © 2017 [address] [employer] Information is for End User's use only and may not be sold, redistributed or otherwise used for commercial purposes  The above information is an  only  It is not intended as medical advice for individual conditions or treatments  Talk to your doctor, nurse or pharmacist before following any medical regimen to see if it is safe and effective for you

## 2018-05-07 NOTE — PROGRESS NOTES
Denies loss of fluid, vaginal bleeding and abdominal pain  Confirms frequent fetal movement  Tolerating prenatal vitamin well  Did not get colposcopy Pap resulted ASCUS/ HPV HR other positive  Patient states she has had a LEEP in the past   Reviewed with patient reason for colposcopy reassured patient biopsy would not be taking unless indicated  Patient agreeable to colposcopy at next visit, written information provided  Current every day smoker approximately 1 pack lasting 2-3 days  Tried using Nicoderm patch- patient believes this was not effective and needs a stronger dose  No other concerns today  On Zoloft daily  Patient believes this is the right dose, feels well   Center ultrasound reviewed- 20 week anatomy scan breech presentation, normal appearing fetal growth, posterior, low lying placenta, CIERA- WNL and  g/11 oz  Recommendations follow up in 8 weeks  Patient states she has this scheduled  PLAN:  1  Continue prenatal vitamin  And Zoloft daily  2  Abnormal Pap smear- prior LEEP     -  ASCUS/ HPV HR  Other +     -  Colposcopy at next visit, written information provided  3  Smoking cessation     -   Rx Nicoderm patch 14 mg/24 hour 1 patch daily     -  Written information provided, patient encouraged not to smoke while using patch  4  Follow-up for missed anatomy and low lying placenta with  Center as scheduled  5   Common discomforts of pregnancy and precautions reviewed  RTO 4 weeks for PN and Colposcopy

## 2018-06-11 ENCOUNTER — ROUTINE PRENATAL (OUTPATIENT)
Dept: OBGYN CLINIC | Facility: HOSPITAL | Age: 38
End: 2018-06-11
Payer: COMMERCIAL

## 2018-06-11 VITALS
SYSTOLIC BLOOD PRESSURE: 107 MMHG | BODY MASS INDEX: 20.01 KG/M2 | HEIGHT: 61 IN | HEART RATE: 91 BPM | DIASTOLIC BLOOD PRESSURE: 65 MMHG | WEIGHT: 106 LBS

## 2018-06-11 DIAGNOSIS — O99.332 TOBACCO USE AFFECTING PREGNANCY IN SECOND TRIMESTER, ANTEPARTUM: ICD-10-CM

## 2018-06-11 DIAGNOSIS — R87.610 ASCUS WITH POSITIVE HIGH RISK HPV CERVICAL: ICD-10-CM

## 2018-06-11 DIAGNOSIS — Z3A.27 27 WEEKS GESTATION OF PREGNANCY: ICD-10-CM

## 2018-06-11 DIAGNOSIS — Z34.92 PRENATAL CARE IN SECOND TRIMESTER: Primary | ICD-10-CM

## 2018-06-11 DIAGNOSIS — Z30.2 REQUEST FOR STERILIZATION: ICD-10-CM

## 2018-06-11 DIAGNOSIS — R87.810 ASCUS WITH POSITIVE HIGH RISK HPV CERVICAL: ICD-10-CM

## 2018-06-11 LAB
SL AMB  POCT GLUCOSE, UA: NORMAL
SL AMB POCT ALBUMIN: NORMAL

## 2018-06-11 PROCEDURE — 81002 URINALYSIS NONAUTO W/O SCOPE: CPT | Performed by: OBSTETRICS & GYNECOLOGY

## 2018-06-11 PROCEDURE — 99213 OFFICE O/P EST LOW 20 MIN: CPT | Performed by: OBSTETRICS & GYNECOLOGY

## 2018-06-11 NOTE — PROGRESS NOTES
Pt is a 46yo  at 27w2d for routine prenatal appointment  She has no complaints today  Denies vaginal bleeding, loss of fluid or ctx  States she is still smoking 1/2PPD  She attempted to try the increased dosing of nicotine patch, however, did not find it useful  States she found herself smoking more with the nice weather  Pt states she has a f/u appointment with [facility] on Monday for low-lying placenta and missed anatomy  Pt also states she recently saw her PCP and had her zoloft refilled  She is doing well on the medication  Mood is stable  Pt was supposed to have colposcopy performed for ASCUS/HPV HR other (+), however, missed appointment due to not having a   Pt also states she would like to have a tubal ligation  She is done with childbearing  Reports she actually signed papers during her last pregnancy, but was unable to have it done due to scheduling issues  Lastly, pt reports she is going to gt her 28wk labs done within the week  She has her slip for [facility]  1) IUP @ 27w2d  Continue routine PN care  Encouraged getting 28wk labs done asap  Refilled PNV   F/u [facility] appointment as scheduled  RTO in 2 weeks  2) Tobacco use during pregnancy  Encouraged cessation, information was provided at last visit  Discussed risks of continued smoking during pregnancy, such as IUGR and low birth weight  3) Abnormal pap smear- prior LEEP  ASCUS/HPV HR Other (+)  Pt to have colposcopy with biopsy 6wk postpartum  4) Hx of Depression  Mood stable  Continue Zoloft daily  5) Desires permanent sterilization  Alternatives, risks and benefits to BTL were discussed in detail  Alternative contraceptive treatments including LARCs, Depo Provera, OCPs (non-estrogen containing until >4w PP), and vasectomy were discussed and patient is 100% sure she would like a BTL as her form of contraception   Patient understands that irrespective of life changes such as a new partner, tragic loss of a child, etc that this is a permanent and irreversible procedure  We discussed risks of infection, bleeding, injury to surrounding structures such as bladder, bowel, uterus, ureters and vasculature, failure to perform procedure, anesthesia complications, shock and possible death  Procedure was reviewed with patient and all questions were answered  Patient is agreeable to proceed with tubal ligation  Consent signed today      D/w Dr Lisa Wooten MD  OBGYN, PGY-1  6/11/2018 11:27 AM

## 2018-06-15 LAB
BASOPHILS # BLD AUTO: 11 CELLS/UL (ref 0–200)
BASOPHILS NFR BLD AUTO: 0.1 %
EOSINOPHIL # BLD AUTO: 107 CELLS/UL (ref 15–500)
EOSINOPHIL NFR BLD AUTO: 1 %
ERYTHROCYTE [DISTWIDTH] IN BLOOD BY AUTOMATED COUNT: 12.8 % (ref 11–15)
GLUCOSE 1H P 50 G GLC PO SERPL-MCNC: 119 MG/DL
HCT VFR BLD AUTO: 24 % (ref 35–45)
HGB BLD-MCNC: 8.2 G/DL (ref 11.7–15.5)
LYMPHOCYTES # BLD AUTO: 2022 CELLS/UL (ref 850–3900)
LYMPHOCYTES NFR BLD AUTO: 18.9 %
MCH RBC QN AUTO: 30.8 PG (ref 27–33)
MCHC RBC AUTO-ENTMCNC: 34.2 G/DL (ref 32–36)
MCV RBC AUTO: 90.2 FL (ref 80–100)
MONOCYTES # BLD AUTO: 578 CELLS/UL (ref 200–950)
MONOCYTES NFR BLD AUTO: 5.4 %
NEUTROPHILS # BLD AUTO: 7982 CELLS/UL (ref 1500–7800)
NEUTROPHILS NFR BLD AUTO: 74.6 %
PLATELET # BLD AUTO: 287 THOUSAND/UL (ref 140–400)
PMV BLD REES-ECKER: 10.7 FL (ref 7.5–12.5)
RBC # BLD AUTO: 2.66 MILLION/UL (ref 3.8–5.1)
RPR SER QL: NORMAL
WBC # BLD AUTO: 10.7 THOUSAND/UL (ref 3.8–10.8)

## 2018-06-18 ENCOUNTER — ULTRASOUND (OUTPATIENT)
Dept: PERINATAL CARE | Facility: CLINIC | Age: 38
End: 2018-06-18
Payer: COMMERCIAL

## 2018-06-18 VITALS
WEIGHT: 105.8 LBS | DIASTOLIC BLOOD PRESSURE: 61 MMHG | BODY MASS INDEX: 19.98 KG/M2 | HEIGHT: 61 IN | SYSTOLIC BLOOD PRESSURE: 99 MMHG | HEART RATE: 90 BPM

## 2018-06-18 DIAGNOSIS — O09.523 ELDERLY MULTIGRAVIDA, THIRD TRIMESTER: Primary | ICD-10-CM

## 2018-06-18 DIAGNOSIS — Z3A.28 28 WEEKS GESTATION OF PREGNANCY: ICD-10-CM

## 2018-06-18 DIAGNOSIS — O44.42 LOW-LYING PLACENTA IN SECOND TRIMESTER: ICD-10-CM

## 2018-06-18 PROCEDURE — 76816 OB US FOLLOW-UP PER FETUS: CPT | Performed by: OBSTETRICS & GYNECOLOGY

## 2018-06-18 PROCEDURE — 99212 OFFICE O/P EST SF 10 MIN: CPT | Performed by: OBSTETRICS & GYNECOLOGY

## 2018-06-18 PROCEDURE — 76817 TRANSVAGINAL US OBSTETRIC: CPT | Performed by: OBSTETRICS & GYNECOLOGY

## 2018-06-18 NOTE — LETTER
June 18, 2018     yN Castro PROVIDER    Patient: Derril Frankel   YOB: 1980   Date of Visit: 6/18/2018       Dear   Provider: Thank you for referring Derril Frankel to me for evaluation  Below are my notes for this consultation  If you have questions, please do not hesitate to call me  I look forward to following your patient along with you  Sincerely,        Natty Estes MD        CC: No Recipients  Natty Estes MD  6/18/2018  1:26 PM  Sign at close encounter  Please refer to the [facility] ultrasound report in Ob Procedures for additional information regarding the visit to the [facility], [facility]  today

## 2018-06-18 NOTE — PROGRESS NOTES
Please refer to the [facility] ultrasound report in Ob Procedures for additional information regarding the visit to the [facility], [facility]  today

## 2018-06-25 ENCOUNTER — HOSPITAL ENCOUNTER (OUTPATIENT)
Dept: INFUSION CENTER | Facility: HOSPITAL | Age: 38
Discharge: HOME | End: 2018-06-25
Payer: COMMERCIAL

## 2018-06-25 ENCOUNTER — ROUTINE PRENATAL (OUTPATIENT)
Dept: OBGYN CLINIC | Facility: HOSPITAL | Age: 38
End: 2018-06-25
Payer: COMMERCIAL

## 2018-06-25 VITALS
WEIGHT: 107 LBS | BODY MASS INDEX: 21.01 KG/M2 | HEART RATE: 116 BPM | DIASTOLIC BLOOD PRESSURE: 63 MMHG | HEIGHT: 60 IN | SYSTOLIC BLOOD PRESSURE: 102 MMHG

## 2018-06-25 VITALS
RESPIRATION RATE: 16 BRPM | SYSTOLIC BLOOD PRESSURE: 98 MMHG | DIASTOLIC BLOOD PRESSURE: 54 MMHG | TEMPERATURE: 99 F | HEART RATE: 88 BPM

## 2018-06-25 DIAGNOSIS — O99.013 ANEMIA AFFECTING PREGNANCY IN THIRD TRIMESTER: ICD-10-CM

## 2018-06-25 DIAGNOSIS — Z23 NEED FOR TDAP VACCINATION: Primary | ICD-10-CM

## 2018-06-25 DIAGNOSIS — Z34.93 PRENATAL CARE IN THIRD TRIMESTER: ICD-10-CM

## 2018-06-25 DIAGNOSIS — Z3A.29 29 WEEKS GESTATION OF PREGNANCY: ICD-10-CM

## 2018-06-25 PROBLEM — O44.40 LOW-LYING PLACENTA: Status: RESOLVED | Noted: 2018-04-23 | Resolved: 2018-06-25

## 2018-06-25 PROCEDURE — 99213 OFFICE O/P EST LOW 20 MIN: CPT | Performed by: OBSTETRICS & GYNECOLOGY

## 2018-06-25 PROCEDURE — 96365 THER/PROPH/DIAG IV INF INIT: CPT

## 2018-06-25 PROCEDURE — 90715 TDAP VACCINE 7 YRS/> IM: CPT | Performed by: OBSTETRICS & GYNECOLOGY

## 2018-06-25 PROCEDURE — 90471 IMMUNIZATION ADMIN: CPT | Performed by: OBSTETRICS & GYNECOLOGY

## 2018-06-25 RX ADMIN — IRON SUCROSE 200 MG: 20 INJECTION, SOLUTION INTRAVENOUS at 14:18

## 2018-06-25 NOTE — PROGRESS NOTES
Venofer infused without incident, pt tolerated well and offered no complaints  AVS printed and all upcoming appts reviewed w patients

## 2018-06-25 NOTE — PROGRESS NOTES
Pt presents for routine prenatal visit at 29w2d  28 wk labs reveiwed with pt: hgb 8 2, 1hr glucola 119, RPR non-reactive  Pt c/o feeling tired, weak, short of breath  Only takes prenatal vitamin, no iron supplement  Denies vaginal bleeding, loss of fluid, ctx  Reports positive fetal movement   Has repeat growth scan on 7/30  Low lying placenta resolved, now posterior  1) Anemia in pregnancy: hgb 8 2 and pt symptomatic  Venofer infusion set up for twice weekly for 4 weeks  First appointment today at [phone]  Will f/u CBC in one month  2) Tobacco use in pregnancy: encouraged cessation again, pt f/u growth scan at [facility] on 7/30/18     3) Continue routine prenatal care  Tdap given today  [address] and pre-term labor precautions reviewed  RTO in 2 weeks    Britt Alvarez MD  OBGYN, PGY-1  6/25/2018 12:44 PM

## 2018-06-28 ENCOUNTER — HOSPITAL ENCOUNTER (OUTPATIENT)
Dept: INFUSION CENTER | Facility: HOSPITAL | Age: 38
Discharge: HOME | End: 2018-06-28

## 2018-06-29 ENCOUNTER — HOSPITAL ENCOUNTER (OUTPATIENT)
Dept: INFUSION CENTER | Facility: HOSPITAL | Age: 38
Discharge: HOME | End: 2018-06-29
Payer: COMMERCIAL

## 2018-06-29 VITALS
SYSTOLIC BLOOD PRESSURE: 102 MMHG | OXYGEN SATURATION: 99 % | RESPIRATION RATE: 18 BRPM | HEART RATE: 86 BPM | DIASTOLIC BLOOD PRESSURE: 50 MMHG | TEMPERATURE: 98 F

## 2018-06-29 PROCEDURE — 96365 THER/PROPH/DIAG IV INF INIT: CPT

## 2018-06-29 RX ADMIN — IRON SUCROSE 200 MG: 20 INJECTION, SOLUTION INTRAVENOUS at 14:10

## 2018-07-02 ENCOUNTER — HOSPITAL ENCOUNTER (OUTPATIENT)
Dept: INFUSION CENTER | Facility: HOSPITAL | Age: 38
Discharge: HOME | End: 2018-07-02

## 2018-07-05 ENCOUNTER — HOSPITAL ENCOUNTER (OUTPATIENT)
Dept: INFUSION CENTER | Facility: HOSPITAL | Age: 38
Discharge: HOME | End: 2018-07-05
Payer: COMMERCIAL

## 2018-07-05 PROCEDURE — 96365 THER/PROPH/DIAG IV INF INIT: CPT

## 2018-07-05 RX ADMIN — IRON SUCROSE 200 MG: 20 INJECTION, SOLUTION INTRAVENOUS at 13:53

## 2018-07-05 NOTE — PLAN OF CARE
Problem: Potential for Falls  Goal: Patient will remain free of falls  INTERVENTIONS:  - Assess patient frequently for physical needs  -  Identify cognitive and physical deficits and behaviors that affect risk of falls    -  [place] fall precautions as indicated by assessment   - Educate patient/family on patient safety including physical limitations  - Instruct patient to call for assistance with activity based on assessment  - Modify environment to reduce risk of injury  - Consider OT/PT consult to assist with strengthening/mobility   Outcome: Progressing

## 2018-07-09 ENCOUNTER — ROUTINE PRENATAL (OUTPATIENT)
Dept: OBGYN CLINIC | Facility: HOSPITAL | Age: 38
End: 2018-07-09
Payer: COMMERCIAL

## 2018-07-09 VITALS — DIASTOLIC BLOOD PRESSURE: 62 MMHG | BODY MASS INDEX: 21.09 KG/M2 | WEIGHT: 108 LBS | SYSTOLIC BLOOD PRESSURE: 100 MMHG

## 2018-07-09 DIAGNOSIS — Z3A.29 29 WEEKS GESTATION OF PREGNANCY: ICD-10-CM

## 2018-07-09 DIAGNOSIS — Z34.93 PRENATAL CARE IN THIRD TRIMESTER: ICD-10-CM

## 2018-07-09 PROCEDURE — 99213 OFFICE O/P EST LOW 20 MIN: CPT | Performed by: OBSTETRICS & GYNECOLOGY

## 2018-07-09 NOTE — PROGRESS NOTES
Assessment & Plan  Paris Willett is a 41 y o   at 32w1d presenting for routine prenatal visit  Tobacco <1/2 PPD: declines nicoderm  Depression/Anxiety: denies SI/HI; well controlled: c/w zoloft  Anemia: venofer infusions; s/p 3-4; scheduled for 8 altogether; next appt today      ____________________________________________________________      Subjective  Denies domestic violence; She denies contractions, loss of fluid, or vaginal bleeding  She feels regular fetal movements  Objective    OB History    Para Term  AB Living   3 2 2 0 0 2   SAB TAB Ectopic Multiple Live Births   0 0 0 0 2      # Outcome Date GA Lbr Star/2nd Weight Sex Delivery Anes PTL Lv   3 Current            2 Term 07/11/15 38w0d  2722 g (6 lb) M Vag-Spont EPI  MARILUZ   1 Term 07/23/10 39w0d  2637 g (5 lb 13 oz) F Vag-Spont EPI N MARILUZ            Vitals:    18 1108   BP: 100/62   Weight: 49 kg (108 lb)       most recent [facility] ultrasound reviewed  ;   most recent PNV reviewed; Pt education:  Diet counseling: encouraged soda + white rice + white bread avoidance; encouraged home prepared meals    Weight counseling: weight gain goals during pregnancy reviewed  Weight at start of pregnancy:  43 5 kg (96 lb)    Most recent 5 weights: Wt Readings from Last 5 Encounters:   18 49 kg (108 lb)   18 48 5 kg (107 lb)   18 48 kg (105 lb 12 8 oz)   18 48 1 kg (106 lb)   18 46 9 kg (103 lb 6 4 oz)       wt gain since last visit: +1 lbs ;   wt gain since start of pregnancy: +12 lbs     Body mass index is 21 09 kg/m²      Prepregnancy Wt BMI Gain Gain/wk   Underweight <18 5 28-40 1   Normal <25 25-31 1   Overweight <30 15-25 0 6   Obese 30+ 11-20 0 5

## 2018-07-12 ENCOUNTER — HOSPITAL ENCOUNTER (OUTPATIENT)
Dept: INFUSION CENTER | Facility: HOSPITAL | Age: 38
Discharge: HOME | End: 2018-07-12
Payer: COMMERCIAL

## 2018-07-12 VITALS
DIASTOLIC BLOOD PRESSURE: 58 MMHG | TEMPERATURE: 98 F | SYSTOLIC BLOOD PRESSURE: 101 MMHG | RESPIRATION RATE: 18 BRPM | HEART RATE: 89 BPM

## 2018-07-12 PROCEDURE — 96365 THER/PROPH/DIAG IV INF INIT: CPT

## 2018-07-12 RX ADMIN — IRON SUCROSE 200 MG: 20 INJECTION, SOLUTION INTRAVENOUS at 14:22

## 2018-07-12 NOTE — PLAN OF CARE
Problem: Potential for Falls  Goal: Patient will remain free of falls  INTERVENTIONS:  - Assess patient frequently for physical needs  -  Identify cognitive and physical deficits and behaviors that affect risk of falls    -  [place] fall precautions as indicated by assessment   - Educate patient/family on patient safety including physical limitations  - Instruct patient to call for assistance with activity based on assessment  - Modify environment to reduce risk of injury  - Consider OT/PT consult to assist with strengthening/mobility   Outcome: Progressing

## 2018-07-20 ENCOUNTER — HOSPITAL ENCOUNTER (OUTPATIENT)
Dept: INFUSION CENTER | Facility: HOSPITAL | Age: 38
Discharge: HOME | End: 2018-07-20

## 2018-07-23 ENCOUNTER — HOSPITAL ENCOUNTER (OUTPATIENT)
Dept: INFUSION CENTER | Facility: HOSPITAL | Age: 38
Discharge: HOME | End: 2018-07-23
Payer: COMMERCIAL

## 2018-07-23 ENCOUNTER — ROUTINE PRENATAL (OUTPATIENT)
Dept: OBGYN CLINIC | Facility: HOSPITAL | Age: 38
End: 2018-07-23
Payer: COMMERCIAL

## 2018-07-23 VITALS
WEIGHT: 110 LBS | BODY MASS INDEX: 21.6 KG/M2 | SYSTOLIC BLOOD PRESSURE: 115 MMHG | DIASTOLIC BLOOD PRESSURE: 68 MMHG | HEIGHT: 60 IN | HEART RATE: 91 BPM

## 2018-07-23 VITALS
RESPIRATION RATE: 18 BRPM | HEART RATE: 80 BPM | SYSTOLIC BLOOD PRESSURE: 90 MMHG | DIASTOLIC BLOOD PRESSURE: 53 MMHG | TEMPERATURE: 98.4 F

## 2018-07-23 DIAGNOSIS — Z34.93 PRENATAL CARE IN THIRD TRIMESTER: ICD-10-CM

## 2018-07-23 DIAGNOSIS — O99.013 ANEMIA AFFECTING PREGNANCY IN THIRD TRIMESTER: ICD-10-CM

## 2018-07-23 DIAGNOSIS — Z3A.33 33 WEEKS GESTATION OF PREGNANCY: Primary | ICD-10-CM

## 2018-07-23 DIAGNOSIS — O09.523 ELDERLY MULTIGRAVIDA, THIRD TRIMESTER: ICD-10-CM

## 2018-07-23 DIAGNOSIS — O99.333 TOBACCO USE AFFECTING PREGNANCY IN THIRD TRIMESTER, ANTEPARTUM: ICD-10-CM

## 2018-07-23 PROCEDURE — 96365 THER/PROPH/DIAG IV INF INIT: CPT

## 2018-07-23 PROCEDURE — 81002 URINALYSIS NONAUTO W/O SCOPE: CPT | Performed by: NURSE PRACTITIONER

## 2018-07-23 PROCEDURE — 99213 OFFICE O/P EST LOW 20 MIN: CPT | Performed by: NURSE PRACTITIONER

## 2018-07-23 RX ADMIN — IRON SUCROSE 200 MG: 20 INJECTION, SOLUTION INTRAVENOUS at 12:52

## 2018-07-23 NOTE — PROGRESS NOTES
Denies loss of fluid, vaginal bleeding and abdominal pain  Confirms frequent fetal movement, doing fetal kick counts daily  Has last Venofer infusion 18  Will order CBC at next visit  Had episode on Saturday of pain less contractions described as tightening lasted approximately 2 hr with  resolution after rest and hydration  Smoking half a pack of cigarettes per day, declines intervention  Depression well controlled with Zoloft 50 mg daily  Denies SI/HI  Patient plans include epidural, formula feeding and Depo-Provera for postpartum contraception until permanent female sterilization  MA  Form 31 signed 18    Center growth scan reviewed from 18; vertex presentation, normal appearing fetal growth, posterior placenta, no placenta previa, CIERA -WNL and  g/2 lb 2 oz (19%)  Recommendations for growth scan at 36 weeks , patient has this scheduled  plans:  1  Continue prenatal vitamins daily  2  Maternal anemia     -  Venofer infusions  # 8 ( last dose)  Scheduled for 18     -  Order CBC at next visit  3  Depression/anxiety- continue Zoloft daily  4  Growth scan scheduled 18  5  Checking in card provided, patient encouraged to make appointment  6  Desires permanent female sterilization, written information provided  MA form 31 signed 18  7  Common discomforts of pregnancy and precautions including  labor reviewed    Patient verbalizes understanding how to reach [facility] on-call after office hours  RTO 2 weeks

## 2018-07-23 NOTE — PATIENT INSTRUCTIONS
Kick Counts in Pregnancy   AMBULATORY CARE:   Kick counts  measure how much your baby is moving in your womb  A kick from your baby can be felt as a twist, turn, swish, roll, or jab  It is common to feel your baby kicking at 26 to 28 weeks of pregnancy  You may feel your baby kick as early as 20 weeks of pregnancy  Seek care immediately if:   · You feel your baby kick less as the day goes on      · You do not feel any kicks in a day  Contact your healthcare provider if:   · You feel a change in the number of kicks or movements of your baby  · You feel fewer than 10 kicks within 2 hours after counting twice  · You have questions or concerns about your baby's movements  Why measure kick counts:  Your baby's movement may provide information about your baby's health  He may move less, or not at all, if there are problems  He may move less if he does not have enough room to grow in your uterus (womb)  He may also move less if he is not getting enough oxygen or nutrition from the placenta  Tell your healthcare provider as soon as you feel a change in your baby's movements  Problems that are found earlier are easier to treat  When to measure kick counts:   · Measure kick counts at the same time every day  · Measure kick counts when your baby is awake and most active  Your baby may be most active in the evening  · Measure kick counts after a meal or snack  Your baby may be more active after you eat  Wait 2 hours after you drink liquids that contain caffeine  Caffeine can make your baby more active than usual     · You should not smoke while you are pregnant  Smoking increases the risk of health problems for you and for your baby during your pregnancy  If you do smoke, wait 2 hours to measure kick counts  Nicotine can make your baby more active than usual   How to measure kick counts:  Check that your baby is awake before you measure kick counts   You can wake up your baby by lightly pushing on your belly, walking, or drinking something cold  Your healthcare provider may tell you different ways to measure kick counts  He may tell you to do the following:  · Use a chart or clock to keep track of the time you start and finish counting  · Sit in a chair or lie on your left side  · Place your hands on the largest part of your belly  · Count until you reach 10 kicks  Write down how much time it takes to count 10 kicks  · It may take 30 minutes to 2 hours to count 10 kicks  It should not take more than 2 hours to count 10 kicks  · If you do not feel 10 kicks within 2 hours, wait 1 hour and count again  Your baby can sleep for up to 40 minutes at one time  Follow up with your healthcare provider as directed:  Write down your questions so you remember to ask them during your visits  © 2017 [address] Information is for End User's use only and may not be sold, redistributed or otherwise used for commercial purposes  All illustrations and images included in CareNotes® are the copyrighted property of [employer]  or [employer]  The above information is an  only  It is not intended as medical advice for individual conditions or treatments  Talk to your doctor, nurse or pharmacist before following any medical regimen to see if it is safe and effective for you  Pregnancy at 28 to [address]:   What changes are happening in my body? You are considered full term at the beginning of 37 weeks  Your breathing may be easier if your baby has moved down into a head-down position  You may need to urinate more often because the baby may be pressing on your bladder  You may also feel more discomfort and get tired easily  How do I care for myself at this stage of my pregnancy? · Eat a variety of healthy foods  Healthy foods include fruits, vegetables, whole-grain breads, low-fat dairy foods, beans, lean meats, and fish   Drink liquids as directed  Ask how much liquid to drink each day and which liquids are best for you  Limit caffeine to less than 200 milligrams each day  Limit your intake of fish to 2 servings each week  Choose fish low in mercury such as canned light tuna, shrimp, salmon, cod, or tilapia  Do not  eat fish high in mercury such as swordfish, tilefish, maverick mackerel, and shark  · Take prenatal vitamins as directed  Your need for certain vitamins and minerals, such as folic acid, increases during pregnancy  Prenatal vitamins provide some of the extra vitamins and minerals you need  Prenatal vitamins may also help to decrease the risk of certain birth defects  · Rest as needed  Put your feet up if you have swelling in your ankles and feet  · Do not smoke  If you smoke, it is never too late to quit  Smoking increases your risk of a miscarriage and other health problems during your pregnancy  Smoking can cause your baby to be born too early or weigh less at birth  Ask your healthcare provider for information if you need help quitting  · Do not drink alcohol  Alcohol passes from your body to your baby through the placenta  It can affect your baby's brain development and cause fetal alcohol syndrome (FAS)  FAS is a group of conditions that causes mental, behavior, and growth problems  · Talk to your healthcare provider before you take any medicines  Many medicines may harm your baby if you take them when you are pregnant  Do not take any medicines, vitamins, herbs, or supplements without first talking to your healthcare provider  Never use illegal or street drugs (such as marijuana or cocaine) while you are pregnant  · Talk to your healthcare provider before you travel  You may not be able to travel in an airplane after 36 weeks  He may also recommend that you avoid long road trips  What are some safety tips during pregnancy? · Avoid hot tubs and saunas    Do not use a hot tub or sauna while you are pregnant, especially during your first trimester  Hot tubs and saunas may raise your baby's temperature and increase the risk of birth defects  · Avoid toxoplasmosis  This is an infection caused by eating raw meat or being around infected cat feces  It can cause birth defects, miscarriages, and other problems  Wash your hands after you touch raw meat  Make sure any meat is well-cooked before you eat it  Avoid raw eggs and unpasteurized milk  Use gloves or ask someone else to clean your cat's litter box while you are pregnant  · Ask your healthcare provider about travel  The most comfortable time to travel is during the second trimester  Ask your healthcare provider if you can travel after 36 weeks  You may not be able to travel in an airplane after 36 weeks  He may also recommend that you avoid long road trips  What changes are happening with my baby? By 38 weeks, your baby may weigh between 6 and 9 pounds  Your baby may be about 14 inches long from the top of the head to the rump (baby's bottom)  Your baby hears well enough to know your voice  As your baby gets larger, you may feel fewer kicks and more stretching and rolling  Your baby may move into a head-down position  Your baby will also rest lower in your abdomen  What do I need to know about prenatal care? Your healthcare provider will check your blood pressure and weight  You may also need the following:  · A urine test  may also be done to check for sugar and protein  These can be signs of gestational diabetes or infection  Protein in your urine may also be a sign of preeclampsia  Preeclampsia is a condition that can develop during week 20 or later of your pregnancy  It causes high blood pressure, and it can cause problems with your kidneys and other organs  · A blood test  may be done to check for anemia (low iron level)  · A Tdap vaccine  may be recommended by your healthcare provider       · A group B strep test  is a test that is done to check for group B strep infection  Group B strep is a type of bacteria that may be found in the vagina or rectum  It can be passed to your baby during delivery if you have it  Your healthcare provider will take swab your vagina or rectum and send the sample to the lab for tests  · Fundal height  is a measurement of your uterus to check your baby's growth  This number is usually the same as the number of weeks that you have been pregnant  Your healthcare provider may also check your baby's position  · Your baby's heart rate  will be checked  When should I seek immediate care? · You develop a severe headache that does not go away  · You have new or increased vision changes, such as blurred or spotted vision  · You have new or increased swelling in your face or hands  · You have vaginal spotting or bleeding  · Your water broke or you feel warm water gushing or trickling from your vagina  When should I contact my healthcare provider? · You have more than 5 contractions in 1 hour  · You notice any changes in your baby's movements  · You have abdominal cramps, pressure, or tightening  · You have a change in vaginal discharge  · You have chills or a fever  · You have vaginal itching, burning, or pain  · You have yellow, green, white, or foul-smelling vaginal discharge  · You have pain or burning when you urinate, less urine than usual, or pink or bloody urine  · You have questions or concerns about your condition or care  CARE AGREEMENT:   You have the right to help plan your care  Learn about your health condition and how it may be treated  Discuss treatment options with your caregivers to decide what care you want to receive  You always have the right to refuse treatment  The above information is an  only  It is not intended as medical advice for individual conditions or treatments   Talk to your doctor, nurse or pharmacist before following any medical regimen to see if it is safe and effective for you  © 2017 [address] Information is for End User's use only and may not be sold, redistributed or otherwise used for commercial purposes  All illustrations and images included in CareNotes® are the copyrighted property of [employer]  or Nhan Miller  Pregnancy at 31 to [address]:   What changes are happening in my body? You may continue to have symptoms such as shortness of breath, heartburn, contractions, or swelling of your ankles and feet  You may be gaining about 1 pound a week now  How do I care for myself at this stage of my pregnancy? · Eat a variety of healthy foods  Healthy foods include fruits, vegetables, whole-grain breads, low-fat dairy foods, beans, lean meats, and fish  Drink liquids as directed  Ask how much liquid to drink each day and which liquids are best for you  Limit caffeine to less than 200 milligrams each day  Limit your intake of fish to 2 servings each week  Choose fish low in mercury such as canned light tuna, shrimp, salmon, cod, or tilapia  Do not  eat fish high in mercury such as swordfish, tilefish, mvaerick mackerel, and shark  · Manage heartburn  by eating 4 or 5 small meals each day instead of large meals  Avoid spicy food  · Manage swelling  by lying down and putting your feet up  · Take prenatal vitamins as directed  Your need for certain vitamins and minerals, such as folic acid, increases during pregnancy  Prenatal vitamins provide some of the extra vitamins and minerals you need  Prenatal vitamins may also help to decrease the risk of certain birth defects  · Talk to your healthcare provider about exercise  Moderate exercise can help you stay fit  Your healthcare provider will help you plan an exercise program that is safe for you during pregnancy  · Do not smoke  If you smoke, it is never too late to quit   Smoking increases your risk of a miscarriage and other health problems during your pregnancy  Smoking can cause your baby to be born too early or weigh less at birth  Ask your healthcare provider for information if you need help quitting  · Do not drink alcohol  Alcohol passes from your body to your baby through the placenta  It can affect your baby's brain development and cause fetal alcohol syndrome (FAS)  FAS is a group of conditions that causes mental, behavior, and growth problems  · Talk to your healthcare provider before you take any medicines  Many medicines may harm your baby if you take them when you are pregnant  Do not take any medicines, vitamins, herbs, or supplements without first talking to your healthcare provider  Never use illegal or street drugs (such as marijuana or cocaine) while you are pregnant  What are some safety tips during pregnancy? · Avoid hot tubs and saunas  Do not use a hot tub or sauna while you are pregnant, especially during your first trimester  Hot tubs and saunas may raise your baby's temperature and increase the risk of birth defects  · Avoid toxoplasmosis  This is an infection caused by eating raw meat or being around infected cat feces  It can cause birth defects, miscarriages, and other problems  Wash your hands after you touch raw meat  Make sure any meat is well-cooked before you eat it  Avoid raw eggs and unpasteurized milk  Use gloves or ask someone else to clean your cat's litter box while you are pregnant  What changes are happening with my baby? By 34 weeks, your baby may weigh more than 5 pounds  Your baby will be about 12 ½ inches long from the top of the head to the rump (baby's bottom)  Your baby is gaining about ½ pound a week  Your baby's eyes open and close now  Your baby's kicks and movements are more forceful at this time  What do I need to know about prenatal care? Your healthcare provider will check your blood pressure and weight   You may also need the following:  · A urine test may also be done to check for sugar and protein  These can be signs of gestational diabetes or infection  Protein in your urine may also be a sign of preeclampsia  Preeclampsia is a condition that can develop during week 20 or later of your pregnancy  It causes high blood pressure, and it can cause problems with your kidneys and other organs  · A Tdap vaccine  may be recommended by your healthcare provider  · Fundal height  is a measurement of your uterus to check your baby's growth  This number is usually the same as the number of weeks that you have been pregnant  Your healthcare provider may also check your baby's position  · Your baby's heart rate  will be checked  When should I seek immediate care? · You develop a severe headache that does not go away  · You have new or increased vision changes, such as blurred or spotted vision  · You have new or increased swelling in your face or hands  · You have vaginal spotting or bleeding  · Your water broke or you feel warm water gushing or trickling from your vagina  When should I contact my healthcare provider? · You have more than 5 contractions in 1 hour  · You notice any changes in your baby's movements  · You have abdominal cramps, pressure, or tightening  · You have a change in vaginal discharge  · You have chills or a fever  · You have vaginal itching, burning, or pain  · You have yellow, green, white, or foul-smelling vaginal discharge  · You have pain or burning when you urinate, less urine than usual, or pink or bloody urine  · You have questions or concerns about your condition or care  CARE AGREEMENT:   You have the right to help plan your care  Learn about your health condition and how it may be treated  Discuss treatment options with your caregivers to decide what care you want to receive  You always have the right to refuse treatment  The above information is an  only   It is not intended as medical advice for individual conditions or treatments  Talk to your doctor, nurse or pharmacist before following any medical regimen to see if it is safe and effective for you  © 2017 [address] Information is for End User's use only and may not be sold, redistributed or otherwise used for commercial purposes  All illustrations and images included in CareNotes® are the copyrighted property of [employer]  or [employer]

## 2018-07-23 NOTE — PLAN OF CARE
Problem: Potential for Falls  Goal: Patient will remain free of falls  INTERVENTIONS:  - Assess patient frequently for physical needs  -  Identify cognitive and physical deficits and behaviors that affect risk of falls    -  [place] fall precautions as indicated by assessment   - Educate patient/family on patient safety including physical limitations  - Instruct patient to call for assistance with activity based on assessment  - Modify environment to reduce risk of injury  - Consider OT/PT consult to assist with strengthening/mobility   Outcome: Progressing

## 2018-07-30 ENCOUNTER — ULTRASOUND (OUTPATIENT)
Dept: PERINATAL CARE | Facility: CLINIC | Age: 38
End: 2018-07-30
Payer: COMMERCIAL

## 2018-07-30 VITALS
DIASTOLIC BLOOD PRESSURE: 69 MMHG | HEIGHT: 60 IN | HEART RATE: 109 BPM | BODY MASS INDEX: 21.36 KG/M2 | WEIGHT: 108.8 LBS | SYSTOLIC BLOOD PRESSURE: 118 MMHG

## 2018-07-30 DIAGNOSIS — Z3A.34 34 WEEKS GESTATION OF PREGNANCY: ICD-10-CM

## 2018-07-30 DIAGNOSIS — O09.523 ELDERLY MULTIGRAVIDA IN THIRD TRIMESTER: ICD-10-CM

## 2018-07-30 DIAGNOSIS — Z34.93 PRENATAL CARE IN THIRD TRIMESTER: ICD-10-CM

## 2018-07-30 DIAGNOSIS — O99.333 TOBACCO USE AFFECTING PREGNANCY IN THIRD TRIMESTER, ANTEPARTUM: ICD-10-CM

## 2018-07-30 PROCEDURE — 99213 OFFICE O/P EST LOW 20 MIN: CPT | Performed by: OBSTETRICS & GYNECOLOGY

## 2018-07-30 PROCEDURE — 76820 UMBILICAL ARTERY ECHO: CPT | Performed by: OBSTETRICS & GYNECOLOGY

## 2018-07-30 PROCEDURE — 76821 MIDDLE CEREBRAL ARTERY ECHO: CPT | Performed by: OBSTETRICS & GYNECOLOGY

## 2018-07-30 PROCEDURE — 76816 OB US FOLLOW-UP PER FETUS: CPT | Performed by: OBSTETRICS & GYNECOLOGY

## 2018-07-30 PROCEDURE — 59025 FETAL NON-STRESS TEST: CPT | Performed by: OBSTETRICS & GYNECOLOGY

## 2018-07-30 NOTE — PROGRESS NOTES
A fetal ultrasound for growth, umbilical cord Doppler  MCA Doppler and an NST were done; see under Ob procedures and under ultrasound tab

## 2018-08-02 ENCOUNTER — ROUTINE PRENATAL (OUTPATIENT)
Dept: PERINATAL CARE | Facility: CLINIC | Age: 38
End: 2018-08-02
Payer: COMMERCIAL

## 2018-08-02 VITALS
DIASTOLIC BLOOD PRESSURE: 57 MMHG | SYSTOLIC BLOOD PRESSURE: 96 MMHG | BODY MASS INDEX: 21.75 KG/M2 | HEIGHT: 60 IN | WEIGHT: 110.8 LBS | HEART RATE: 90 BPM

## 2018-08-02 DIAGNOSIS — Z34.93 PRENATAL CARE IN THIRD TRIMESTER: ICD-10-CM

## 2018-08-02 DIAGNOSIS — Z3A.34 34 WEEKS GESTATION OF PREGNANCY: ICD-10-CM

## 2018-08-02 DIAGNOSIS — O36.5930 POOR FETAL GROWTH AFFECTING MANAGEMENT OF MOTHER IN THIRD TRIMESTER, SINGLE OR UNSPECIFIED FETUS: Primary | ICD-10-CM

## 2018-08-02 DIAGNOSIS — O99.333 TOBACCO USE AFFECTING PREGNANCY IN THIRD TRIMESTER, ANTEPARTUM: ICD-10-CM

## 2018-08-02 PROCEDURE — 76820 UMBILICAL ARTERY ECHO: CPT | Performed by: OBSTETRICS & GYNECOLOGY

## 2018-08-02 PROCEDURE — 76818 FETAL BIOPHYS PROFILE W/NST: CPT | Performed by: OBSTETRICS & GYNECOLOGY

## 2018-08-02 PROCEDURE — 76821 MIDDLE CEREBRAL ARTERY ECHO: CPT | Performed by: OBSTETRICS & GYNECOLOGY

## 2018-08-02 NOTE — PROGRESS NOTES
Non-Stress Testing:    Non-Stress test, equipment, and expected outcomes reviewed  Verified with teach back method  Pt verbalizes +FM   Pt denies ALL:               Leaking of fluid   Contractions   Vaginal bleeding   Decreased fetal movement        NST length of time: 22 minutes

## 2018-08-06 ENCOUNTER — ROUTINE PRENATAL (OUTPATIENT)
Dept: OBGYN CLINIC | Facility: HOSPITAL | Age: 38
End: 2018-08-06
Payer: COMMERCIAL

## 2018-08-06 ENCOUNTER — ROUTINE PRENATAL (OUTPATIENT)
Dept: PERINATAL CARE | Facility: CLINIC | Age: 38
End: 2018-08-06
Payer: COMMERCIAL

## 2018-08-06 VITALS
DIASTOLIC BLOOD PRESSURE: 53 MMHG | HEART RATE: 79 BPM | WEIGHT: 109.8 LBS | HEIGHT: 60 IN | SYSTOLIC BLOOD PRESSURE: 103 MMHG | BODY MASS INDEX: 21.55 KG/M2

## 2018-08-06 VITALS
SYSTOLIC BLOOD PRESSURE: 99 MMHG | HEART RATE: 86 BPM | WEIGHT: 109.4 LBS | BODY MASS INDEX: 21.48 KG/M2 | HEIGHT: 60 IN | DIASTOLIC BLOOD PRESSURE: 63 MMHG

## 2018-08-06 DIAGNOSIS — Z34.93 PRENATAL CARE IN THIRD TRIMESTER: ICD-10-CM

## 2018-08-06 DIAGNOSIS — O99.333 TOBACCO USE AFFECTING PREGNANCY IN THIRD TRIMESTER, ANTEPARTUM: ICD-10-CM

## 2018-08-06 DIAGNOSIS — Z3A.35 35 WEEKS GESTATION OF PREGNANCY: ICD-10-CM

## 2018-08-06 DIAGNOSIS — O36.5930 POOR FETAL GROWTH AFFECTING MANAGEMENT OF MOTHER IN THIRD TRIMESTER, SINGLE OR UNSPECIFIED FETUS: ICD-10-CM

## 2018-08-06 DIAGNOSIS — O99.013 ANEMIA AFFECTING PREGNANCY IN THIRD TRIMESTER: Primary | ICD-10-CM

## 2018-08-06 DIAGNOSIS — O36.5930 POOR FETAL GROWTH AFFECTING MANAGEMENT OF MOTHER IN THIRD TRIMESTER, SINGLE OR UNSPECIFIED FETUS: Primary | ICD-10-CM

## 2018-08-06 PROBLEM — Z3A.34 34 WEEKS GESTATION OF PREGNANCY: Status: ACTIVE | Noted: 2018-02-16

## 2018-08-06 PROCEDURE — 76821 MIDDLE CEREBRAL ARTERY ECHO: CPT | Performed by: OBSTETRICS & GYNECOLOGY

## 2018-08-06 PROCEDURE — 76815 OB US LIMITED FETUS(S): CPT | Performed by: OBSTETRICS & GYNECOLOGY

## 2018-08-06 PROCEDURE — 99213 OFFICE O/P EST LOW 20 MIN: CPT | Performed by: NURSE PRACTITIONER

## 2018-08-06 PROCEDURE — 59025 FETAL NON-STRESS TEST: CPT | Performed by: OBSTETRICS & GYNECOLOGY

## 2018-08-06 PROCEDURE — 76820 UMBILICAL ARTERY ECHO: CPT | Performed by: OBSTETRICS & GYNECOLOGY

## 2018-08-06 RX ORDER — PRENATAL 168/IRON/FOLIC/OMEGA3 27-800-235
1 CAPSULE ORAL DAILY
Refills: 2 | COMMUNITY
Start: 2018-07-12 | End: 2018-08-06

## 2018-08-06 NOTE — PATIENT INSTRUCTIONS
Kick Counts in Pregnancy   AMBULATORY CARE:   Kick counts  measure how much your baby is moving in your womb  A kick from your baby can be felt as a twist, turn, swish, roll, or jab  It is common to feel your baby kicking at 26 to 28 weeks of pregnancy  You may feel your baby kick as early as 20 weeks of pregnancy  Seek care immediately if:   · You feel your baby kick less as the day goes on      · You do not feel any kicks in a day  Contact your healthcare provider if:   · You feel a change in the number of kicks or movements of your baby  · You feel fewer than 10 kicks within 2 hours after counting twice  · You have questions or concerns about your baby's movements  Why measure kick counts:  Your baby's movement may provide information about your baby's health  He may move less, or not at all, if there are problems  He may move less if he does not have enough room to grow in your uterus (womb)  He may also move less if he is not getting enough oxygen or nutrition from the placenta  Tell your healthcare provider as soon as you feel a change in your baby's movements  Problems that are found earlier are easier to treat  When to measure kick counts:   · Measure kick counts at the same time every day  · Measure kick counts when your baby is awake and most active  Your baby may be most active in the evening  · Measure kick counts after a meal or snack  Your baby may be more active after you eat  Wait 2 hours after you drink liquids that contain caffeine  Caffeine can make your baby more active than usual     · You should not smoke while you are pregnant  Smoking increases the risk of health problems for you and for your baby during your pregnancy  If you do smoke, wait 2 hours to measure kick counts  Nicotine can make your baby more active than usual   How to measure kick counts:  Check that your baby is awake before you measure kick counts   You can wake up your baby by lightly pushing on your belly, walking, or drinking something cold  Your healthcare provider may tell you different ways to measure kick counts  He may tell you to do the following:  · Use a chart or clock to keep track of the time you start and finish counting  · Sit in a chair or lie on your left side  · Place your hands on the largest part of your belly  · Count until you reach 10 kicks  Write down how much time it takes to count 10 kicks  · It may take 30 minutes to 2 hours to count 10 kicks  It should not take more than 2 hours to count 10 kicks  · If you do not feel 10 kicks within 2 hours, wait 1 hour and count again  Your baby can sleep for up to 40 minutes at one time  Follow up with your healthcare provider as directed:  Write down your questions so you remember to ask them during your visits  © 2017 [address] [employer] Information is for End User's use only and may not be sold, redistributed or otherwise used for commercial purposes  All illustrations and images included in CareNotes® are the copyrighted property of [employer]  or Nhan Miller  The above information is an  only  It is not intended as medical advice for individual conditions or treatments  Talk to your doctor, nurse or pharmacist before following any medical regimen to see if it is safe and effective for you  Pregnancy at [address] to [address]:   What changes are happening in my body? You are considered full term at the beginning of 37 weeks  Your breathing may be easier if your baby has moved down into a head-down position  You may need to urinate more often because the baby may be pressing on your bladder  You may also feel more discomfort and get tired easily  How do I care for myself at this stage of my pregnancy? · Eat a variety of healthy foods  Healthy foods include fruits, vegetables, whole-grain breads, low-fat dairy foods, beans, lean meats, and fish   Drink liquids as directed  Ask how much liquid to drink each day and which liquids are best for you  Limit caffeine to less than 200 milligrams each day  Limit your intake of fish to 2 servings each week  Choose fish low in mercury such as canned light tuna, shrimp, salmon, cod, or tilapia  Do not  eat fish high in mercury such as swordfish, tilefish, maverick mackerel, and shark  · Take prenatal vitamins as directed  Your need for certain vitamins and minerals, such as folic acid, increases during pregnancy  Prenatal vitamins provide some of the extra vitamins and minerals you need  Prenatal vitamins may also help to decrease the risk of certain birth defects  · Rest as needed  Put your feet up if you have swelling in your ankles and feet  · Do not smoke  If you smoke, it is never too late to quit  Smoking increases your risk of a miscarriage and other health problems during your pregnancy  Smoking can cause your baby to be born too early or weigh less at birth  Ask your healthcare provider for information if you need help quitting  · Do not drink alcohol  Alcohol passes from your body to your baby through the placenta  It can affect your baby's brain development and cause fetal alcohol syndrome (FAS)  FAS is a group of conditions that causes mental, behavior, and growth problems  · Talk to your healthcare provider before you take any medicines  Many medicines may harm your baby if you take them when you are pregnant  Do not take any medicines, vitamins, herbs, or supplements without first talking to your healthcare provider  Never use illegal or street drugs (such as marijuana or cocaine) while you are pregnant  · Talk to your healthcare provider before you travel  You may not be able to travel in an airplane after 36 weeks  He may also recommend that you avoid long road trips  What are some safety tips during pregnancy? · Avoid hot tubs and saunas    Do not use a hot tub or sauna while you are pregnant, especially during your first trimester  Hot tubs and saunas may raise your baby's temperature and increase the risk of birth defects  · Avoid toxoplasmosis  This is an infection caused by eating raw meat or being around infected cat feces  It can cause birth defects, miscarriages, and other problems  Wash your hands after you touch raw meat  Make sure any meat is well-cooked before you eat it  Avoid raw eggs and unpasteurized milk  Use gloves or ask someone else to clean your cat's litter box while you are pregnant  · Ask your healthcare provider about travel  The most comfortable time to travel is during the second trimester  Ask your healthcare provider if you can travel after 36 weeks  You may not be able to travel in an airplane after 36 weeks  He may also recommend that you avoid long road trips  What changes are happening with my baby? By 38 weeks, your baby may weigh between 6 and 9 pounds  Your baby may be about 14 inches long from the top of the head to the rump (baby's bottom)  Your baby hears well enough to know your voice  As your baby gets larger, you may feel fewer kicks and more stretching and rolling  Your baby may move into a head-down position  Your baby will also rest lower in your abdomen  What do I need to know about prenatal care? Your healthcare provider will check your blood pressure and weight  You may also need the following:  · A urine test  may also be done to check for sugar and protein  These can be signs of gestational diabetes or infection  Protein in your urine may also be a sign of preeclampsia  Preeclampsia is a condition that can develop during week 20 or later of your pregnancy  It causes high blood pressure, and it can cause problems with your kidneys and other organs  · A blood test  may be done to check for anemia (low iron level)  · A Tdap vaccine  may be recommended by your healthcare provider       · A group B strep test  is a test that is done to check for group B strep infection  Group B strep is a type of bacteria that may be found in the vagina or rectum  It can be passed to your baby during delivery if you have it  Your healthcare provider will take swab your vagina or rectum and send the sample to the lab for tests  · Fundal height  is a measurement of your uterus to check your baby's growth  This number is usually the same as the number of weeks that you have been pregnant  Your healthcare provider may also check your baby's position  · Your baby's heart rate  will be checked  When should I seek immediate care? · You develop a severe headache that does not go away  · You have new or increased vision changes, such as blurred or spotted vision  · You have new or increased swelling in your face or hands  · You have vaginal spotting or bleeding  · Your water broke or you feel warm water gushing or trickling from your vagina  When should I contact my healthcare provider? · You have more than 5 contractions in 1 hour  · You notice any changes in your baby's movements  · You have abdominal cramps, pressure, or tightening  · You have a change in vaginal discharge  · You have chills or a fever  · You have vaginal itching, burning, or pain  · You have yellow, green, white, or foul-smelling vaginal discharge  · You have pain or burning when you urinate, less urine than usual, or pink or bloody urine  · You have questions or concerns about your condition or care  CARE AGREEMENT:   You have the right to help plan your care  Learn about your health condition and how it may be treated  Discuss treatment options with your caregivers to decide what care you want to receive  You always have the right to refuse treatment  The above information is an  only  It is not intended as medical advice for individual conditions or treatments   Talk to your doctor, nurse or pharmacist before following any medical regimen to see if it is safe and effective for you  © 2017 [address] [employer] Information is for End User's use only and may not be sold, redistributed or otherwise used for commercial purposes  All illustrations and images included in CareNotes® are the copyrighted property of [employer] D A M , Inc  or Nhan Miller  Cigarette Smoking and Your Health   AMBULATORY CARE:   Risks to your health if you smoke:  Nicotine and other chemicals found in tobacco damage every cell in your body  Even if you are a light smoker, you have an increased risk for cancer, heart disease, and lung disease  If you are pregnant or have diabetes, smoking increases your risk for complications  Benefits to your health if you stop smoking:   · You decrease respiratory symptoms such as coughing, wheezing, and shortness of breath  · You reduce your risk for cancers of the lung, mouth, throat, kidney, bladder, pancreas, stomach, and cervix  If you already have cancer, you increase the benefits of chemotherapy  You also reduce your risk for cancer returning or a second cancer from developing  · You reduce your risk for heart disease, blood clots, heart attack, and stroke  · You reduce your risk for lung infections, and diseases such as pneumonia, asthma, chronic bronchitis, and emphysema  · Your circulation improves  More oxygen can be delivered to your body  If you have diabetes, you lower your risk for complications, such as kidney, artery, and eye diseases  You also lower your risk for nerve damage  Nerve damage can lead to amputations, poor vision, and blindness  · You improve your body's ability to heal and to fight infections  Benefits to the health of others if you stop smoking:  Tobacco is harmful to nonsmokers who breathe in your secondhand smoke   The following are ways the health of others around you may improve when you stop smoking:  · You lower the risks for lung cancer and heart disease in nonsmoking adults  · If you are pregnant, you lower the risk for miscarriage, early delivery, low birth weight, and stillbirth  You also lower your baby's risk for SIDS, obesity, developmental delay, and neurobehavioral problems, such as ADHD  · If you have children, you lower their risk for ear infections, colds, pneumonia, bronchitis, and asthma  For more information and support to stop smoking:   · [place]  Phone: 1- 613 - 155-7223  Web Address: www [employer]  Follow up with your healthcare provider as directed:  Write down your questions so you remember to ask them during your visits  © 2017 [address] [facility] Information is for End User's use only and may not be sold, redistributed or otherwise used for commercial purposes  All illustrations and images included in CareNotes® are the copyrighted property of [employer]  or Nhan Miller  The above information is an  only  It is not intended as medical advice for individual conditions or treatments  Talk to your doctor, nurse or pharmacist before following any medical regimen to see if it is safe and effective for you

## 2018-08-06 NOTE — PROGRESS NOTES
Denies loss of fluid, vaginal bleeding and contractions  Had contractions Friday night  Into Saturday; lasting approximately 4 hours with contractions every 3-7 minutes spontaneous resolution  Confirms frequent fetal movement, doing fetal kick counts daily  Tolerating prenatal vitamin well  Completed iron infusion 18- due for CBC prior to next appointment  Patient plans on buying iron, slow release from [employer]  Patient cut down smoking is down to 5 cigarettes per day  Depression well controlled with Zoloft 50 mg daily  Denies SI/ HI   Center ultrasound 18 for indication of low maternal BMI and advanced maternal age reviewed- vertex presentation, posterior placenta, no placenta previa, CIERA-WNL and EFW 1690 g/ 3 lb 12 oz (<5%)      Plan:  1  Continue prenatal vitamins daily  2  Continue fetal kick counts daily  3  Maternal anemia     -  Venofer last dose  (#8) 18     -  CBC ordered  4  Depression/ anxiety- continue Zoloft  5  SGA versus IUGR      - Growth scan scheduled for 18     -  Continue  fetal surveillance as scheduled by  Center  6  Smoking cessation encouraged written  information provided  7  Common discomforts of pregnancy and precautions including  labor reviewed    RTO 1 week GBS at next visit

## 2018-08-09 ENCOUNTER — ROUTINE PRENATAL (OUTPATIENT)
Dept: PERINATAL CARE | Facility: CLINIC | Age: 38
End: 2018-08-09
Payer: COMMERCIAL

## 2018-08-09 VITALS
HEIGHT: 60 IN | SYSTOLIC BLOOD PRESSURE: 102 MMHG | DIASTOLIC BLOOD PRESSURE: 66 MMHG | WEIGHT: 109.8 LBS | HEART RATE: 99 BPM | BODY MASS INDEX: 21.55 KG/M2

## 2018-08-09 DIAGNOSIS — Z3A.35 35 WEEKS GESTATION OF PREGNANCY: ICD-10-CM

## 2018-08-09 DIAGNOSIS — O99.333 TOBACCO USE AFFECTING PREGNANCY IN THIRD TRIMESTER, ANTEPARTUM: ICD-10-CM

## 2018-08-09 DIAGNOSIS — O36.5930 POOR FETAL GROWTH AFFECTING MANAGEMENT OF MOTHER IN THIRD TRIMESTER, SINGLE OR UNSPECIFIED FETUS: Primary | ICD-10-CM

## 2018-08-09 PROCEDURE — 59025 FETAL NON-STRESS TEST: CPT | Performed by: OBSTETRICS & GYNECOLOGY

## 2018-08-09 NOTE — PROGRESS NOTES
[address]: Ms Gali Yeager was seen today at 35w5d for NST (found under the pregnancy episode) which I reviewed the RN assessment and agree  Please don't hesitate to contact our office with any concerns or questions    Rut Le MD

## 2018-08-13 ENCOUNTER — ULTRASOUND (OUTPATIENT)
Dept: PERINATAL CARE | Facility: CLINIC | Age: 38
DRG: 560 | End: 2018-08-13
Payer: COMMERCIAL

## 2018-08-13 ENCOUNTER — HOSPITAL ENCOUNTER (INPATIENT)
Facility: HOSPITAL | Age: 38
LOS: 3 days | Discharge: HOME | DRG: 560 | End: 2018-08-16
Attending: OBSTETRICS & GYNECOLOGY | Admitting: OBSTETRICS & GYNECOLOGY
Payer: COMMERCIAL

## 2018-08-13 VITALS
HEIGHT: 60 IN | WEIGHT: 110 LBS | SYSTOLIC BLOOD PRESSURE: 105 MMHG | HEART RATE: 93 BPM | BODY MASS INDEX: 21.6 KG/M2 | DIASTOLIC BLOOD PRESSURE: 67 MMHG

## 2018-08-13 DIAGNOSIS — Z3A.36 36 WEEKS GESTATION OF PREGNANCY: ICD-10-CM

## 2018-08-13 DIAGNOSIS — O36.5930 POOR FETAL GROWTH AFFECTING MANAGEMENT OF MOTHER IN THIRD TRIMESTER, SINGLE OR UNSPECIFIED FETUS: Primary | ICD-10-CM

## 2018-08-13 PROBLEM — O36.5990 IUGR (INTRAUTERINE GROWTH RESTRICTION) AFFECTING CARE OF MOTHER: Status: ACTIVE | Noted: 2018-08-06

## 2018-08-13 LAB
ABO GROUP BLD: NORMAL
AMPHETAMINES SERPL QL SCN: NEGATIVE
BARBITURATES UR QL: NEGATIVE
BASOPHILS # BLD AUTO: 0.01 THOUSANDS/ΜL (ref 0–0.1)
BASOPHILS NFR BLD AUTO: 0 % (ref 0–1)
BENZODIAZ UR QL: NEGATIVE
BLD GP AB SCN SERPL QL: NEGATIVE
COCAINE UR QL: NEGATIVE
EOSINOPHIL # BLD AUTO: 0.09 THOUSAND/ΜL (ref 0–0.61)
EOSINOPHIL NFR BLD AUTO: 1 % (ref 0–6)
ERYTHROCYTE [DISTWIDTH] IN BLOOD BY AUTOMATED COUNT: 14 % (ref 11.6–15.1)
HCT VFR BLD AUTO: 25.3 % (ref 34.8–46.1)
HGB BLD-MCNC: 8.5 G/DL (ref 11.5–15.4)
IMM GRANULOCYTES # BLD AUTO: 0.11 THOUSAND/UL (ref 0–0.2)
IMM GRANULOCYTES NFR BLD AUTO: 1 % (ref 0–2)
LYMPHOCYTES # BLD AUTO: 2.47 THOUSANDS/ΜL (ref 0.6–4.47)
LYMPHOCYTES NFR BLD AUTO: 21 % (ref 14–44)
MCH RBC QN AUTO: 30.9 PG (ref 26.8–34.3)
MCHC RBC AUTO-ENTMCNC: 33.6 G/DL (ref 31.4–37.4)
MCV RBC AUTO: 92 FL (ref 82–98)
METHADONE UR QL: NEGATIVE
MONOCYTES # BLD AUTO: 0.48 THOUSAND/ΜL (ref 0.17–1.22)
MONOCYTES NFR BLD AUTO: 4 % (ref 4–12)
NEUTROPHILS # BLD AUTO: 8.38 THOUSANDS/ΜL (ref 1.85–7.62)
NEUTS SEG NFR BLD AUTO: 73 % (ref 43–75)
NRBC BLD AUTO-RTO: 0 /100 WBCS
OPIATES UR QL SCN: NEGATIVE
PCP UR QL: NEGATIVE
PLATELET # BLD AUTO: 265 THOUSANDS/UL (ref 149–390)
PMV BLD AUTO: 10.2 FL (ref 8.9–12.7)
RBC # BLD AUTO: 2.75 MILLION/UL (ref 3.81–5.12)
RH BLD: POSITIVE
SPECIMEN EXPIRATION DATE: NORMAL
THC UR QL: NEGATIVE
WBC # BLD AUTO: 11.54 THOUSAND/UL (ref 4.31–10.16)

## 2018-08-13 PROCEDURE — 76820 UMBILICAL ARTERY ECHO: CPT | Performed by: OBSTETRICS & GYNECOLOGY

## 2018-08-13 PROCEDURE — 76816 OB US FOLLOW-UP PER FETUS: CPT | Performed by: OBSTETRICS & GYNECOLOGY

## 2018-08-13 PROCEDURE — 76821 MIDDLE CEREBRAL ARTERY ECHO: CPT | Performed by: OBSTETRICS & GYNECOLOGY

## 2018-08-13 PROCEDURE — 86901 BLOOD TYPING SEROLOGIC RH(D): CPT | Performed by: OBSTETRICS & GYNECOLOGY

## 2018-08-13 PROCEDURE — 87653 STREP B DNA AMP PROBE: CPT | Performed by: OBSTETRICS & GYNECOLOGY

## 2018-08-13 PROCEDURE — 59025 FETAL NON-STRESS TEST: CPT | Performed by: OBSTETRICS & GYNECOLOGY

## 2018-08-13 PROCEDURE — 86900 BLOOD TYPING SEROLOGIC ABO: CPT | Performed by: OBSTETRICS & GYNECOLOGY

## 2018-08-13 PROCEDURE — 85025 COMPLETE CBC W/AUTO DIFF WBC: CPT | Performed by: OBSTETRICS & GYNECOLOGY

## 2018-08-13 PROCEDURE — 86592 SYPHILIS TEST NON-TREP QUAL: CPT | Performed by: OBSTETRICS & GYNECOLOGY

## 2018-08-13 PROCEDURE — 86850 RBC ANTIBODY SCREEN: CPT | Performed by: OBSTETRICS & GYNECOLOGY

## 2018-08-13 PROCEDURE — 99212 OFFICE O/P EST SF 10 MIN: CPT | Performed by: OBSTETRICS & GYNECOLOGY

## 2018-08-13 PROCEDURE — 80307 DRUG TEST PRSMV CHEM ANLYZR: CPT | Performed by: OBSTETRICS & GYNECOLOGY

## 2018-08-13 RX ORDER — ONDANSETRON 2 MG/ML
4 INJECTION INTRAMUSCULAR; INTRAVENOUS EVERY 6 HOURS PRN
Status: DISCONTINUED | OUTPATIENT
Start: 2018-08-13 | End: 2018-08-16 | Stop reason: HOSPADM

## 2018-08-13 RX ORDER — SODIUM CHLORIDE, SODIUM LACTATE, POTASSIUM CHLORIDE, CALCIUM CHLORIDE 600; 310; 30; 20 MG/100ML; MG/100ML; MG/100ML; MG/100ML
125 INJECTION, SOLUTION INTRAVENOUS CONTINUOUS
Status: DISCONTINUED | OUTPATIENT
Start: 2018-08-13 | End: 2018-08-16 | Stop reason: HOSPADM

## 2018-08-13 RX ORDER — BETAMETHASONE SODIUM PHOSPHATE AND BETAMETHASONE ACETATE 3; 3 MG/ML; MG/ML
12 INJECTION, SUSPENSION INTRA-ARTICULAR; INTRALESIONAL; INTRAMUSCULAR; SOFT TISSUE EVERY 24 HOURS
Status: DISCONTINUED | OUTPATIENT
Start: 2018-08-13 | End: 2018-08-14

## 2018-08-13 RX ADMIN — SODIUM CHLORIDE 5 MILLION UNITS: 0.9 INJECTION, SOLUTION INTRAVENOUS at 20:32

## 2018-08-13 RX ADMIN — SODIUM CHLORIDE, SODIUM LACTATE, POTASSIUM CHLORIDE, AND CALCIUM CHLORIDE 125 ML/HR: .6; .31; .03; .02 INJECTION, SOLUTION INTRAVENOUS at 20:32

## 2018-08-13 RX ADMIN — BETAMETHASONE SODIUM PHOSPHATE AND BETAMETHASONE ACETATE 12 MG: 3; 3 INJECTION, SUSPENSION INTRA-ARTICULAR; INTRALESIONAL; INTRAMUSCULAR at 17:33

## 2018-08-13 NOTE — LETTER
August 13, 2018     Ny Castro PROVIDER    Patient: Charbel Leon   YOB: 1980   Date of Visit: 8/13/2018       Dear   Provider: Thank you for referring Charbel Leon to me for evaluation  Below are my notes for this consultation  If you have questions, please do not hesitate to call me  I look forward to following your patient along with you  Sincerely,        Evelio Fournier MD        CC: No Recipients  Evelio Fournier MD  8/13/2018  3:58 PM  Sign at close encounter  Please refer to the [facility] ultrasound report in Ob Procedures for additional information regarding the visit to the [facility], [facility]  today

## 2018-08-13 NOTE — PROGRESS NOTES
Please refer to the [facility] ultrasound report in Ob Procedures for additional information regarding the visit to the [facility]  today

## 2018-08-13 NOTE — H&P
H&P Exam - Obstetrics   Arnulfo Cook 41 y o  female MRN: [id number]  Unit/Bed#: LD Triage 3 Encounter: [id number]    >2 Midnights    INPATIENT     Pt is under the care of [facility]    History of Present Illness   Chief Complaint: "I'm here to be induced "    HPI:  Arnulfo Cook is a 41 y o   female with an VIANCA of 2018, by Last Menstrual Period at 36w2d weeks gestation who is being admitted for induction of labor secondary to IUGR  Pt was seen in the  center earlier today for a f/u growth scan and noted to have and EFW of 1832g ([id number]), less than 5%  Given the presence of IUGR and suboptimal interval growth, delivery was recommended  Of note, dopplers were WNL  Contractions: irregular  Leakage of fluid: None  Bleeding: None  Fetal movement: present  Her current obstetrical history is significant for GBS unknown, IUGR, Anemia s/p IV Venofer infusions, Tobacco use, Depression/Anxiety on 50mg of Zoloft, requests permanent sterilization (MA-31 papers signed 18), Hx of cocaine use in  s/p rehab  Review of Systems   Constitutional: Negative for chills and fever  Eyes: Negative for visual disturbance  Respiratory: Negative for cough, shortness of breath and wheezing  Cardiovascular: Negative for chest pain and palpitations  Gastrointestinal: Positive for abdominal pain (mild contractions)  Negative for constipation, diarrhea, nausea and vomiting  Genitourinary: Negative for vaginal bleeding and vaginal discharge  Neurological: Negative for weakness, light-headedness and headaches         Historical Information   OB History    Para Term  AB Living   3 2 2 0 0 2   SAB TAB Ectopic Multiple Live Births   0 0 0 0 2      # Outcome Date GA Lbr Star/2nd Weight Sex Delivery Anes PTL Lv   3 Current            2 Term 07/11/15 38w0d  2722 g (6 lb) M Vag-Spont EPI  MARILUZ   1 Term 07/23/10 39w0d  2637 g (5 lb 13 oz) F Vag-Spont EPI N MARILUZ Baby complications/comments: EFW (Ac/Fl/Hc)1832 grams - 4 lbs 1 oz(<5%); VTX    Past Medical History:   Diagnosis Date    Abnormal Pap smear of cervix     Depression     on zoloft    Normocytic normochromic anemia     resolved 3/6/17    Urinary tract infection     x1 in past    Varicella     Vulvar lump     last assessed 1/19/17     Past Surgical History:   Procedure Laterality Date    CERVICAL BIOPSY  W/ LOOP ELECTRODE EXCISION      1999    COLPOSCOPY      DENTAL SURGERY       Social History   History   Alcohol Use    Yes     Comment: socially     History   Drug Use    Types: Cocaine     Comment: last used 2014, requiring rehab in 2014     History   Smoking Status    Current Every Day Smoker    Packs/day: 0 25    Years: 21 00   Smokeless Tobacco    Never Used     Comment: Counseled to call [phone]     Family History: non-contributory    Meds/Allergies   {  Prescriptions Prior to Admission   Medication    Prenatal MV-Min-Fe Fum-FA-DHA (PRENATAL 1) 30-0 975-200 MG CAPS    sertraline (ZOLOFT) 50 mg tablet     Allergies   Allergen Reactions    Penicillins Other (See Comments)     Pt had wisdom teeth removed and was taking vicodin and PCN at the same time  She had an episode of urinary retention while taking both medications  Otherwise, she has no other allergic reaction    Vicodin [Hydrocodone-Acetaminophen] Other (See Comments)     Pt had wisdom teeth removed and was taking vicodin and PCN at the same time  She had an episode of urinary retention while taking both medications  Otherwise, she has no other allergic reaction         Objective   Vitals: Last menstrual period 12/02/2017  There is no height or weight on file to calculate BMI  Invasive Devices          No matching active lines, drains, or airways          Physical Exam   Constitutional: She is oriented to person, place, and time  She appears well-developed and well-nourished  No distress     HENT:   Head: Normocephalic and atraumatic  Cardiovascular: Normal rate, regular rhythm and normal heart sounds  Exam reveals no gallop and no friction rub  No murmur heard  Pulmonary/Chest: Effort normal and breath sounds normal  No respiratory distress  She has no wheezes  She has no rales  Abdominal: Soft  Bowel sounds are normal    Gravid uterus   Genitourinary: Vagina normal    Musculoskeletal: She exhibits no edema or tenderness  Neurological: She is alert and oriented to person, place, and time  Skin: She is not diaphoretic  Psychiatric: She has a normal mood and affect  Her behavior is normal    Vitals reviewed  Vaginal Exam     Membranes: Intact  FHR: Baseline: 145 bpm, Variability: Moderate 6 - 25 bpm, Accelerations: Reactive, Decelerations: Absent and Category 1    [place]: Frequency: Every 2-4 minutes    Prenatal Labs: I have personally reviewed pertinent reports  , Blood Type:   Lab Results   Component Value Date/Time    ABO Grouping O 07/11/2015 12:04 AM     , D (Rh type):   Lab Results   Component Value Date/Time    Rh Factor Positive 07/11/2015 12:04 AM     , Antibody Screen:   Lab Results   Component Value Date/Time    Antibody Screen Negative 07/11/2015 12:04 AM    , HCT/HGB:   Lab Results   Component Value Date/Time    Hematocrit 24 0 (L) 06/14/2018 01:06 PM    Hematocrit 26 6 (L) 07/11/2015 01:39 PM    Hemoglobin 8 2 (L) 06/14/2018 01:06 PM    Hemoglobin 9 2 (L) 07/11/2015 01:39 PM      , Varicella: positive history    , Rubella: immune     , VDRL/RPR:   Lab Results   Component Value Date/Time    RPR SCREEN Nonreactive 07/11/2015 12:04 AM    RPR NON-REACTIVE 06/14/2018 01:06 PM      , Hep B: non-reactive  ,HIV: non-reactive  , Chlamydia: negative  , Gonorrhea:   Lab Results   Component Value Date/Time    N gonorrhoeae, DNA Probe N  gonorrhoeae Amplified DNA Negative 02/12/2018 12:08 PM     , Group B Strep:  unknown       Imaging, EKG, Pathology, and Other Studies: I have personally reviewed pertinent reports  Assessment/Plan     Assessment:  IUP at 36w2d with IUGR here for induction of labor  Plan:  1) Admit to L&D  2) Routine Labs: CBC, T&S, RPR  3) F/E/N: Clear Liquid diet, IV LR at 125mL/hr  4) Pain: Analgesia and/or epidural upon request  5) Induction with Pitocin/AROM  6) GBS unknown and premature: PCN per protocol, f/u GBS cx  7) IUP at 36w2d: BTM and delay pitocin induction for 12 hrs per MFM  8) Hx of cocaine use in 2014 s/p rehab: f/u UDS      Deana Chatman MD  OBGYN, PGY-2  8/13/2018 5:49 PM

## 2018-08-14 ENCOUNTER — ANESTHESIA EVENT (INPATIENT)
Dept: LABOR AND DELIVERY | Facility: HOSPITAL | Age: 38
DRG: 560 | End: 2018-08-14
Payer: COMMERCIAL

## 2018-08-14 LAB
BASE EXCESS BLDCOA CALC-SCNC: -6.2 MMOL/L (ref 3–11)
HCO3 BLDCOA-SCNC: 22 MMOL/L (ref 17.3–27.3)
O2 CT VFR BLDCOA CALC: 13.1 ML/DL
OXYHGB MFR BLDCOA: 53 %
PCO2 BLDCOA: 52.8 MM[HG] (ref 30–60)
PH BLDCOA: 7.24 [PH] (ref 7.23–7.43)
PO2 BLDCOA: 25.5 MM HG (ref 5–25)
RPR SER QL: NORMAL

## 2018-08-14 PROCEDURE — 88307 TISSUE EXAM BY PATHOLOGIST: CPT | Performed by: PATHOLOGY

## 2018-08-14 PROCEDURE — 4A0HXCZ MEASUREMENT OF PRODUCTS OF CONCEPTION, CARDIAC RATE, EXTERNAL APPROACH: ICD-10-PCS | Performed by: OBSTETRICS & GYNECOLOGY

## 2018-08-14 PROCEDURE — 10907ZC DRAINAGE OF AMNIOTIC FLUID, THERAPEUTIC FROM PRODUCTS OF CONCEPTION, VIA NATURAL OR ARTIFICIAL OPENING: ICD-10-PCS | Performed by: OBSTETRICS & GYNECOLOGY

## 2018-08-14 PROCEDURE — 82805 BLOOD GASES W/O2 SATURATION: CPT | Performed by: OBSTETRICS & GYNECOLOGY

## 2018-08-14 PROCEDURE — 59409 OBSTETRICAL CARE: CPT | Performed by: OBSTETRICS & GYNECOLOGY

## 2018-08-14 PROCEDURE — 3E033VJ INTRODUCTION OF OTHER HORMONE INTO PERIPHERAL VEIN, PERCUTANEOUS APPROACH: ICD-10-PCS | Performed by: OBSTETRICS & GYNECOLOGY

## 2018-08-14 RX ORDER — DOCUSATE SODIUM 100 MG/1
100 CAPSULE, LIQUID FILLED ORAL 2 TIMES DAILY
Status: DISCONTINUED | OUTPATIENT
Start: 2018-08-14 | End: 2018-08-16 | Stop reason: HOSPADM

## 2018-08-14 RX ORDER — ACETAMINOPHEN 325 MG/1
650 TABLET ORAL EVERY 6 HOURS PRN
Status: DISCONTINUED | OUTPATIENT
Start: 2018-08-14 | End: 2018-08-16 | Stop reason: HOSPADM

## 2018-08-14 RX ORDER — OXYTOCIN/RINGER'S LACTATE 30/500 ML
1-30 PLASTIC BAG, INJECTION (ML) INTRAVENOUS
Status: DISCONTINUED | OUTPATIENT
Start: 2018-08-14 | End: 2018-08-16 | Stop reason: HOSPADM

## 2018-08-14 RX ORDER — ONDANSETRON 2 MG/ML
4 INJECTION INTRAMUSCULAR; INTRAVENOUS EVERY 4 HOURS PRN
Status: DISCONTINUED | OUTPATIENT
Start: 2018-08-14 | End: 2018-08-14

## 2018-08-14 RX ORDER — SENNOSIDES 8.6 MG
1 TABLET ORAL DAILY
Status: DISCONTINUED | OUTPATIENT
Start: 2018-08-14 | End: 2018-08-16 | Stop reason: HOSPADM

## 2018-08-14 RX ORDER — DIPHENHYDRAMINE HYDROCHLORIDE 50 MG/ML
25 INJECTION INTRAMUSCULAR; INTRAVENOUS EVERY 6 HOURS PRN
Status: DISCONTINUED | OUTPATIENT
Start: 2018-08-14 | End: 2018-08-16 | Stop reason: HOSPADM

## 2018-08-14 RX ORDER — METOCLOPRAMIDE HYDROCHLORIDE 5 MG/ML
5 INJECTION INTRAMUSCULAR; INTRAVENOUS EVERY 6 HOURS PRN
Status: DISCONTINUED | OUTPATIENT
Start: 2018-08-14 | End: 2018-08-16 | Stop reason: HOSPADM

## 2018-08-14 RX ORDER — OXYTOCIN/RINGER'S LACTATE 30/500 ML
62.5 PLASTIC BAG, INJECTION (ML) INTRAVENOUS
Status: DISPENSED | OUTPATIENT
Start: 2018-08-14 | End: 2018-08-14

## 2018-08-14 RX ORDER — ONDANSETRON 2 MG/ML
4 INJECTION INTRAMUSCULAR; INTRAVENOUS EVERY 8 HOURS PRN
Status: DISCONTINUED | OUTPATIENT
Start: 2018-08-14 | End: 2018-08-14

## 2018-08-14 RX ORDER — BUPIVACAINE HYDROCHLORIDE 2.5 MG/ML
INJECTION, SOLUTION INFILTRATION; PERINEURAL AS NEEDED
Status: DISCONTINUED | OUTPATIENT
Start: 2018-08-14 | End: 2018-08-14 | Stop reason: SURG

## 2018-08-14 RX ORDER — CALCIUM CARBONATE 200(500)MG
1000 TABLET,CHEWABLE ORAL DAILY PRN
Status: DISCONTINUED | OUTPATIENT
Start: 2018-08-14 | End: 2018-08-16 | Stop reason: HOSPADM

## 2018-08-14 RX ORDER — NICOTINE 21 MG/24HR
14 PATCH, TRANSDERMAL 24 HOURS TRANSDERMAL DAILY
Status: DISCONTINUED | OUTPATIENT
Start: 2018-08-14 | End: 2018-08-16 | Stop reason: HOSPADM

## 2018-08-14 RX ORDER — IBUPROFEN 600 MG/1
600 TABLET ORAL EVERY 6 HOURS PRN
Status: DISCONTINUED | OUTPATIENT
Start: 2018-08-14 | End: 2018-08-16 | Stop reason: HOSPADM

## 2018-08-14 RX ADMIN — Medication 62.5 MILLI-UNITS/MIN: at 15:27

## 2018-08-14 RX ADMIN — SODIUM CHLORIDE, SODIUM LACTATE, POTASSIUM CHLORIDE, AND CALCIUM CHLORIDE 125 ML/HR: .6; .31; .03; .02 INJECTION, SOLUTION INTRAVENOUS at 08:00

## 2018-08-14 RX ADMIN — SERTRALINE HYDROCHLORIDE 50 MG: 50 TABLET ORAL at 12:11

## 2018-08-14 RX ADMIN — SODIUM CHLORIDE 2.5 MILLION UNITS: 9 INJECTION, SOLUTION INTRAVENOUS at 05:23

## 2018-08-14 RX ADMIN — BENZOCAINE AND LEVOMENTHOL: 200; 5 SPRAY TOPICAL at 14:52

## 2018-08-14 RX ADMIN — BUPIVACAINE HYDROCHLORIDE 1 ML: 2.5 INJECTION, SOLUTION INFILTRATION; PERINEURAL at 10:32

## 2018-08-14 RX ADMIN — SODIUM CHLORIDE 2.5 MILLION UNITS: 9 INJECTION, SOLUTION INTRAVENOUS at 09:17

## 2018-08-14 RX ADMIN — ACETAMINOPHEN 650 MG: 325 TABLET, FILM COATED ORAL at 04:27

## 2018-08-14 RX ADMIN — Medication 2 MILLI-UNITS/MIN: at 07:44

## 2018-08-14 RX ADMIN — ROPIVACAINE HYDROCHLORIDE: 2 INJECTION, SOLUTION EPIDURAL; INFILTRATION at 10:30

## 2018-08-14 RX ADMIN — IBUPROFEN 600 MG: 600 TABLET ORAL at 16:29

## 2018-08-14 RX ADMIN — SODIUM CHLORIDE 2.5 MILLION UNITS: 9 INJECTION, SOLUTION INTRAVENOUS at 01:09

## 2018-08-14 NOTE — ANESTHESIA PREPROCEDURE EVALUATION
Review of Systems/Medical History  Patient summary reviewed  Chart reviewed  No history of anesthetic complications     Cardiovascular  Negative cardio ROS    Pulmonary  Negative pulmonary ROS        GI/Hepatic  Negative GI/hepatic ROS          Negative  ROS        Endo/Other  Negative endo/other ROS      GYN  Currently pregnant , Prior pregnancy/OB history : 3 Parity: 2,          Hematology  Anemia ,     Musculoskeletal  Negative musculoskeletal ROS        Neurology  Negative neurology ROS      Psychology   Anxiety, Depression ,              Physical Exam    Airway    Mallampati score: II  TM Distance: >3 FB  Neck ROM: full     Dental   No notable dental hx     Cardiovascular  Comment: Negative ROS, Rhythm: regular, Rate: normal, Cardiovascular exam normal    Pulmonary  Pulmonary exam normal Breath sounds clear to auscultation,     Other Findings        Anesthesia Plan  ASA Score- 2     Anesthesia Type- epidural and spinal with ASA Monitors  Additional Monitors:   Airway Plan:         Plan Factors-    Induction-     Postoperative Plan-     Informed Consent- Anesthetic plan and risks discussed with patient  Recent labs personally reviewed:  Lab Results   Component Value Date    WBC 11 54 (H) 2018    HGB 8 5 (L) 2018     2018     No results found for: NA, K, BUN, CREATININE, GLUCOSE  No results found for: PTT   No results found for: INR    Blood type O    No results found for: Tavares Bartholomew, Yoel Peralta MD, have personally seen and evaluated the patient prior to anesthetic care  I have reviewed the pre-anesthetic record, and other medical records if appropriate to the anesthetic care  If a CRNA is involved in the case, I have reviewed the CRNA assessment, if present, and agree  Risks/benefits and alternatives discussed with patient including possible PONV, sore throat, and possibility of rare anesthetic and surgical emergencies

## 2018-08-14 NOTE — ANESTHESIA PROCEDURE NOTES
CSE Block    Patient location during procedure: OB  Start time: 8/14/2018 10:32 AM  Reason for block: procedure for pain and at surgeon's request  Staffing  Anesthesiologist: Roland Ayala  Performed: anesthesiologist   Preanesthetic Checklist  Completed: patient identified, site marked, surgical consent, pre-op evaluation, timeout performed, IV checked, risks and benefits discussed and monitors and equipment checked  CSE  Patient position: sitting  Prep: ChloraPrep  Patient monitoring: cardiac monitor and continuous pulse ox  Approach: midline  Spinal Needle  Needle type: pencil-tip   Needle gauge: 27 G  Needle length: 10 cm  Epidural Needle  Needle type: Tuohy   Needle gauge: 18 G  Location: lumbar (1-5)  Catheter  Catheter type: side hole  Catheter size: 20 G  Catheter at skin depth: 10 cm  Test dose: negative  Assessment  Injection Assessment:  negative aspiration for heme, no paresthesia on injection, positive aspiration for clear CSF and no pain on injection

## 2018-08-14 NOTE — OB LABOR/OXYTOCIN SAFETY PROGRESS
Oxytocin Safety Progress Check Note - Dio Jimenez 41 y o  female MRN: [id number]    Unit/Bed#: -01 Encounter: [id number]    Obstetric History       T2      L2     SAB0   TAB0   Ectopic0   Multiple0   Live Births2      Gestational Age: 36w3d  Dose (kamila-units/min) Oxytocin: 10 kamila-units/min  Contraction Frequency (minutes): 7-9  Contraction Quality: Moderate  Tachysystole: No   Dilation: 4-5        Effacement (%): 60  Station: -1  Baseline Rate: 140 bpm  Fetal Heart Rate: 165 BPM  FHR Category: Category I          Notes/comments:   Patient is comfortable with contractions but notes that they are getting stronger though more spaced out  Pitocin is currently on 8 and will continue to titrate for effective contraction pattern  Plan for Epidural & AROM with next check  Discussed with Dr Mariaelena Willson MD 2018 9:52 AM

## 2018-08-14 NOTE — PLAN OF CARE
ANTEPARTUM     Maintain pregnancy as long as maternal and/or fetal condition is stable Completed        BIRTH - VAGINAL/ SECTION     Fetal and maternal status remain reassuring during the birth process Completed     Emotionally satisfying birthing experience for mother/fetus Completed          DISCHARGE PLANNING     Discharge to home or other facility with appropriate resources Progressing        INFECTION - ADULT     Absence or prevention of progression during hospitalization Progressing     Absence of fever/infection during neutropenic period Progressing        Knowledge Deficit     Patient/family/caregiver demonstrates understanding of disease process, treatment plan, medications, and discharge instructions Progressing        PAIN - ADULT     Verbalizes/displays adequate comfort level or baseline comfort level Progressing        POSTPARTUM     Experiences normal postpartum course Progressing     Appropriate maternal -  bonding Progressing     Establishment of infant feeding pattern Progressing     Incision(s), wounds(s) or drain site(s) healing without S/S of infection Progressing        Potential for Falls     Patient will remain free of falls Progressing        SAFETY ADULT     Patient will remain free of falls Progressing     Maintain or return to baseline ADL function Progressing     Maintain or return mobility status to optimal level Progressing

## 2018-08-14 NOTE — L&D DELIVERY NOTE
Vaginal Delivery Summary - OB/GYN   Lorraine Fields 41 y o  female MRN: [id number]  Unit/Bed#: -01 Encounter: [id number]          Predelivery Diagnosis:  1  Pregnancy at 36w3d     Postdelivery Diagnosis:  1  Same as above  2  Delivery of      Procedure: Spontaneous Vaginal Delivery    Attending: Eugenie Gutierrez    Assistant: Shabnam Aragon     Anesthesia: Epidural    EBL: 250cc  Admission H 5  Admission platelets: 682    Complications: none apparent    Specimens: cord blood, arterial and venous cord blood gasses, placenta to storage     Findings:   1  Viable female at 46, with APGARS of 9 and 9 at 1 and 5 minutes respectively,  2  Spontaneous delivery of intact placenta at 1329  3  No lacerations   4  Blood gases: venous gas was attempted, but was not obtained    Arterial pH: 7 237   Arterial base excess: -6 2       Disposition:  Patient tolerated the procedure well and was recovering in labor and delivery room     Brief history and labor course:  Ms Lorraine Fields is a 41 y o  [id number] at 36wk3d  She presented to labor and delivery for IOL for IUGR (<5%)  Patient was sent directly from the [facility]  after a follow up growth scan showed suboptimal interval growth  Her pregnancy was complicated by unknown GBS status, IUGR, Anemia s/p IV Venofer infusions, Tobacco use, Depression/anxiety (on Zoloft), and history of cocaine abuse and rehabilitation in   She desires permanent sterilization and her consent is signed  On exam in triage she was noted to be 4/70/-2 with vertex presentation  She was started on pitocin titration and continued to make change throughout the day  She was AROM'd at noon, and was complete at 13:09  Description of procedure    After pushing for 5 minutes, at 13:22 patient delivered a viable female , weighing 8lb9oz, apgars of 9 (1 min) and 9 (5 min)  The fetal vertex delivered spontaneously  Baby was checked for nuchal  No nuchal detected   The anterior shoulder delivered atraumatically with maternal expulsive forces and the assistance of downward traction  The posterior shoulder delivered with maternal expulsive forces and the assistance of upward traction  The remainder of the fetus delivered spontaneously  Upon delivery, the infant was placed on the mothers abdomen and the cord was clamped and cut  Delayed cord clamping was performed  The infant was noted to move all extremities appropriately, but did not cry  There was no evidence for injury  Awaiting nurse resuscitators evaluated the   Arterial cord blood gas was collected for analysis  The venous cord gas was attempted but not obtained  These were promptly sent to the lab  In the immediate post-partum, 30 units of IV pitocin was administered, and the uterus was noted to contract down well with massage and pitocin  The placenta delivered spontaneously at 13:29 and was noted to have a centrally inserted 3 vessel cord  The vagina, cervix, perineum, and rectum were inspected and there was noted to be intact  At the conclusion of the procedure, all needle, sponge, and instrument counts were noted to be correct  Patient tolerated the procedure well and was allowed to recover in labor and delivery room with family and  before being transferred to the post-partum floor  The attending was present and participated in all key portions of the case          Cori Hollingsworth MD  2018  2:00 PM

## 2018-08-14 NOTE — ANESTHESIA POSTPROCEDURE EVALUATION
Post-Op Assessment Note      CV Status:  Stable    Mental Status:  Alert and awake    Hydration Status:  Euvolemic    PONV Controlled:  Controlled    Airway Patency:  Patent    Post Op Vitals Reviewed: Yes          Staff: Anesthesiologist     Post-op block assessment: site cleaned, catheter intact and no complications        /45 (08/14/18 1430)    Temp      Pulse     Resp      SpO2

## 2018-08-14 NOTE — PLAN OF CARE
BIRTH - VAGINAL/ SECTION     Fetal and maternal status remain reassuring during the birth process [de-identified]     Emotionally satisfying birthing experience for mother/fetus [address] Discharge to home or other facility with appropriate resources Progressing        INFECTION - ADULT     Absence or prevention of progression during hospitalization Progressing     Absence of fever/infection during neutropenic period Progressing        Knowledge Deficit     Patient/family/caregiver demonstrates understanding of disease process, treatment plan, medications, and discharge instructions Progressing        PAIN - ADULT     Verbalizes/displays adequate comfort level or baseline comfort level Progressing        SAFETY ADULT     Patient will remain free of falls Progressing     Maintain or return to baseline ADL function Progressing     Maintain or return mobility status to optimal level Progressing

## 2018-08-14 NOTE — OB LABOR/OXYTOCIN SAFETY PROGRESS
Oxytocin Safety Progress Check Note - Georgia Rolling 41 y o  female MRN: [id number]    Unit/Bed#: -01 Encounter: [id number]    Obstetric History       T2      L2     SAB0   TAB0   Ectopic0   Multiple0   Live Births2      Gestational Age: 36w3d  Dose (kamila-units/min) Oxytocin: 14 kamila-units/min  Contraction Frequency (minutes): 3-4  Contraction Quality: Moderate, Mild  Tachysystole: No   Dilation: 4-5        Effacement (%): 60  Station: -1  Baseline Rate: 140 bpm  Fetal Heart Rate: 135 BPM  FHR Category: Category I          Notes/comments:   Patient is resting comfortably with epidural  AROM at 1200 for clear fluid  Pitocin currently at 14  Please continue to titrate for good contraction pattern  Will recheck in 2 hours or sooner if indicated and will watch contraction pattern and fetal heart tones  Discussed with Dr Star Matias MD 2018 12:02 PM

## 2018-08-14 NOTE — DISCHARGE SUMMARY
Discharge Summary - OB/GYN   Forest Yates 41 y o  female MRN: [id number]  Unit/Bed#: -01 Encounter: [id number]      Admission Date: 2018     Discharge Date: 2018    Admitting Diagnosis:   1  Pregnancy at 36w3d  2  IUGR  3  GBS unknown status  4  Anemia s/p IV venofer infusions  5  Tobacco use  6  Depression/anxiety  7  Request for permanent sterilization  8  History of cocaine use in 2014    Discharge Diagnosis:   Same, delivered      Procedures: spontaneous vaginal delivery    Attending: Olivia Hook MD    Hospital Course:     Forest Yates is a 41 y o  [id number] at 36w3d wks who was initially admitted for IOL for IUGR (<5%)  Patient was sent directly from the [facility]  after a follow up growth scan showed suboptimal interval growth  Her pregnancy was complicated by unknown GBS status, IUGR, Anemia s/p IV Venofer infusions, Tobacco use, Depression/anxiety (on Zoloft), and history of cocaine abuse and rehabilitation in   She desires permanent sterilization and her consent is signed  On exam in triage she was noted to be 4/70/-2 with vertex presentation  She was started on pitocin titration and continued to make change throughout the day  She was AROM'd at noon, and was complete at 13:09  She delivered a viable female  on 2018 at 13:22  Weight 4lbs 9oz via spontaneous vaginal delivery  Apgars were 9 (1 min) and 9 (5 min)   was transferred to  nursery  Patient tolerated the procedure well and was transferred to recovery in stable condition  Her post-partum course was uncomplicated  Her hemoglobin dropped from 8 6g/dL to 7 6g/dL  She was started on oral iron supplementation  Iron studies and hemoglobin electrophoresis were completed  Her post-partum pain was well controlled with oral analgesics  On day of discharge, she was ambulating and able to reasonably perform all ADLs  She was voiding and had appropriate bowel function  Pain was well controlled  She was discharged home on post-partum day #2 without complications  Patient was instructed to follow up with her OB as an outpatient and was given appropriate warnings to call provider if she develops signs of infection or uncontrolled pain  Complications: none apparent    Condition at discharge: good     Discharge instructions/Information to patient and family:   - Do not place anything (no partner, tampons or douche) in your vagina for 6 weeks  -You may walk for exercise for the first 6 weeks then gradually return to your usual activities    -Please do not drive for 1 week if you have no stitches and for 2 weeks if you have stitches or underwent a  delivery     -You may take baths or shower per your preference    -Please look at your bust (breasts) in the mirror daily and call for redness or tenderness or increased warmth    -Please call us for temperature > 100 4*F or 38* C, worsening pain or a foul discharge       Discharge Medications:   Prenatal vitamin daily for 6 months or the duration of nursing whichever is longer  Motrin 600 mg orally every 6 hours as needed for pain  Tylenol (over the counter) per bottle directions as needed for pain: do NOT use with percocet  Hydrocortisone cream 1% (over the counter) applied 1-2x daily to hemorrhoids as needed    Provisions for Follow-Up Care: Follow up with your doctor in 3 weeks for postpartum care       Planned Readmission: No    Toni Brown MD  OBGYN, PGY-2  2018  6:45 AM

## 2018-08-15 LAB
ERYTHROCYTE [DISTWIDTH] IN BLOOD BY AUTOMATED COUNT: 14.2 % (ref 11.6–15.1)
GP B STREP DNA SPEC QL NAA+PROBE: NORMAL
HCT VFR BLD AUTO: 23.3 % (ref 34.8–46.1)
HGB BLD-MCNC: 7.6 G/DL (ref 11.5–15.4)
MCH RBC QN AUTO: 30.8 PG (ref 26.8–34.3)
MCHC RBC AUTO-ENTMCNC: 32.6 G/DL (ref 31.4–37.4)
MCV RBC AUTO: 94 FL (ref 82–98)
PLATELET # BLD AUTO: 241 THOUSANDS/UL (ref 149–390)
PMV BLD AUTO: 10.3 FL (ref 8.9–12.7)
RBC # BLD AUTO: 2.47 MILLION/UL (ref 3.81–5.12)
RUBV IGG SERPL IA-ACNC: 58.7 IU/ML
WBC # BLD AUTO: 13.81 THOUSAND/UL (ref 4.31–10.16)

## 2018-08-15 PROCEDURE — 86762 RUBELLA ANTIBODY: CPT | Performed by: OBSTETRICS & GYNECOLOGY

## 2018-08-15 PROCEDURE — 99024 POSTOP FOLLOW-UP VISIT: CPT | Performed by: OBSTETRICS & GYNECOLOGY

## 2018-08-15 PROCEDURE — 85027 COMPLETE CBC AUTOMATED: CPT | Performed by: OBSTETRICS & GYNECOLOGY

## 2018-08-15 RX ORDER — FERROUS SULFATE 325(65) MG
325 TABLET ORAL 2 TIMES DAILY WITH MEALS
Status: DISCONTINUED | OUTPATIENT
Start: 2018-08-15 | End: 2018-08-16 | Stop reason: HOSPADM

## 2018-08-15 RX ADMIN — FERROUS SULFATE TAB 325 MG (65 MG ELEMENTAL FE) 325 MG: 325 (65 FE) TAB at 18:39

## 2018-08-15 RX ADMIN — SERTRALINE HYDROCHLORIDE 50 MG: 50 TABLET ORAL at 11:33

## 2018-08-15 RX ADMIN — IBUPROFEN 600 MG: 600 TABLET ORAL at 08:15

## 2018-08-15 RX ADMIN — ACETAMINOPHEN 650 MG: 325 TABLET, FILM COATED ORAL at 18:39

## 2018-08-15 NOTE — PROGRESS NOTES
Progress Note - OB/GYN   Teodoro Riojas 41 y o  female MRN: [id number]  Unit/Bed#: -01 Encounter: [id number]    Assessment:  Post partum Day #1 s/pSVD    Plan:  1  Continue routine post partum care   Encourage ambulation   Patient does not plan to breastfeed   2  Birth control: patient would like a tubal ligation with a Depo bridge    3  Anemia: 8 6 on admission, 7 6 this morning       Subjective: Patient is doing well  She was asleep and had no complaints  Pain: yes, cramping, improved with meds  Tolerating PO: yes  Voiding: yes  Flatus: yes  BM: no   Ambulating: yes  Breastfeeding:  No, plans to formula feed   Chest pain: no  Shortness of breath: no  Leg pain: no  Lochia: minimal    Objective:     Vitals: BP 91/50   Pulse 61   Temp 97 6 °F (36 4 °C) (Oral)   Resp 16   Ht 5' (1 524 m)   Wt 49 9 kg (110 lb)   LMP 12/02/2017 (Exact Date)   SpO2 100%   Breastfeeding?  No   BMI 21 48 kg/m²       Intake/Output Summary (Last 24 hours) at 08/15/18 0609  Last data filed at 08/15/18 0200   Gross per 24 hour   Intake                0 ml   Output             1425 ml   Net            -1425 ml       Lab Results   Component Value Date    WBC 13 81 (H) 08/15/2018    HGB 7 6 (L) 08/15/2018    HCT 23 3 (L) 08/15/2018    MCV 94 08/15/2018     08/15/2018       Physical Exam:     Gen: AAOx3, NAD  CV: RRR  Lungs: CTAB  Abd: Soft, non-tender, non-distended, no rebound or guarding  Uterine fundus firm and non-tender  Ext: Non tender, no swelling     Jacob Dixon MD  8/15/2018  6:09 AM

## 2018-08-15 NOTE — PLAN OF CARE
DISCHARGE PLANNING     Discharge to home or other facility with appropriate resources Progressing        INFECTION - ADULT     Absence or prevention of progression during hospitalization Progressing     Absence of fever/infection during neutropenic period Progressing        Knowledge Deficit     Patient/family/caregiver demonstrates understanding of disease process, treatment plan, medications, and discharge instructions Progressing        PAIN - ADULT     Verbalizes/displays adequate comfort level or baseline comfort level Progressing        POSTPARTUM     Experiences normal postpartum course Progressing     Appropriate maternal -  bonding Progressing     Establishment of infant feeding pattern Progressing        Potential for Falls     Patient will remain free of falls Progressing        SAFETY ADULT     Patient will remain free of falls Progressing     Maintain or return to baseline ADL function Progressing     Maintain or return mobility status to optimal level Progressing

## 2018-08-16 VITALS
SYSTOLIC BLOOD PRESSURE: 96 MMHG | RESPIRATION RATE: 16 BRPM | DIASTOLIC BLOOD PRESSURE: 48 MMHG | HEART RATE: 55 BPM | WEIGHT: 110 LBS | OXYGEN SATURATION: 98 % | BODY MASS INDEX: 21.6 KG/M2 | TEMPERATURE: 98.8 F | HEIGHT: 60 IN

## 2018-08-16 PROBLEM — D50.9 IRON DEFICIENCY ANEMIA: Status: ACTIVE | Noted: 2018-06-25

## 2018-08-16 PROBLEM — O09.523 ELDERLY MULTIGRAVIDA, THIRD TRIMESTER: Status: RESOLVED | Noted: 2018-06-18 | Resolved: 2018-08-16

## 2018-08-16 PROBLEM — Z30.2 REQUEST FOR STERILIZATION: Status: RESOLVED | Noted: 2018-06-11 | Resolved: 2018-08-16

## 2018-08-16 PROBLEM — Z3A.36 36 WEEKS GESTATION OF PREGNANCY: Status: RESOLVED | Noted: 2018-02-16 | Resolved: 2018-08-16

## 2018-08-16 PROBLEM — O36.5990 IUGR (INTRAUTERINE GROWTH RESTRICTION) AFFECTING CARE OF MOTHER: Status: RESOLVED | Noted: 2018-08-06 | Resolved: 2018-08-16

## 2018-08-16 LAB
FERRITIN SERPL-MCNC: 222 NG/ML (ref 8–388)
IRON SATN MFR SERPL: 19 %
IRON SERPL-MCNC: 69 UG/DL (ref 50–170)
TIBC SERPL-MCNC: 367 UG/DL (ref 250–450)

## 2018-08-16 PROCEDURE — 99024 POSTOP FOLLOW-UP VISIT: CPT | Performed by: OBSTETRICS & GYNECOLOGY

## 2018-08-16 PROCEDURE — 83550 IRON BINDING TEST: CPT | Performed by: OBSTETRICS & GYNECOLOGY

## 2018-08-16 PROCEDURE — 83020 HEMOGLOBIN ELECTROPHORESIS: CPT | Performed by: OBSTETRICS & GYNECOLOGY

## 2018-08-16 PROCEDURE — 82728 ASSAY OF FERRITIN: CPT | Performed by: OBSTETRICS & GYNECOLOGY

## 2018-08-16 PROCEDURE — 83540 ASSAY OF IRON: CPT | Performed by: OBSTETRICS & GYNECOLOGY

## 2018-08-16 RX ORDER — MEDROXYPROGESTERONE ACETATE 150 MG/ML
150 INJECTION, SUSPENSION INTRAMUSCULAR ONCE
Status: COMPLETED | OUTPATIENT
Start: 2018-08-16 | End: 2018-08-16

## 2018-08-16 RX ORDER — DOCUSATE SODIUM 100 MG/1
100 CAPSULE, LIQUID FILLED ORAL 2 TIMES DAILY
Qty: 10 CAPSULE | Refills: 0
Start: 2018-08-16

## 2018-08-16 RX ORDER — ACETAMINOPHEN 325 MG/1
TABLET ORAL
Qty: 30 TABLET | Refills: 0
Start: 2018-08-16

## 2018-08-16 RX ORDER — IBUPROFEN 600 MG/1
600 TABLET ORAL EVERY 6 HOURS PRN
Qty: 30 TABLET | Refills: 0
Start: 2018-08-16

## 2018-08-16 RX ADMIN — SERTRALINE HYDROCHLORIDE 50 MG: 50 TABLET ORAL at 08:50

## 2018-08-16 RX ADMIN — MEDROXYPROGESTERONE ACETATE 150 MG: 150 INJECTION, SUSPENSION, EXTENDED RELEASE INTRAMUSCULAR at 12:40

## 2018-08-16 RX ADMIN — FERROUS SULFATE TAB 325 MG (65 MG ELEMENTAL FE) 325 MG: 325 (65 FE) TAB at 08:50

## 2018-08-16 RX ADMIN — IBUPROFEN 600 MG: 600 TABLET ORAL at 08:49

## 2018-08-16 NOTE — PLAN OF CARE
DISCHARGE PLANNING     Discharge to home or other facility with appropriate resources Completed        INFECTION - ADULT     Absence or prevention of progression during hospitalization Completed     Absence of fever/infection during neutropenic period Completed        Knowledge Deficit     Patient/family/caregiver demonstrates understanding of disease process, treatment plan, medications, and discharge instructions Completed        PAIN - ADULT     Verbalizes/displays adequate comfort level or baseline comfort level Completed        POSTPARTUM     Experiences normal postpartum course Completed     Appropriate maternal -  bonding Completed     Establishment of infant feeding pattern Completed     Incision(s), wounds(s) or drain site(s) healing without S/S of infection Completed        Potential for Falls     Patient will remain free of falls Completed        SAFETY ADULT     Patient will remain free of falls Completed     Maintain or return to baseline ADL function Completed     Maintain or return mobility status to optimal level Completed

## 2018-08-16 NOTE — CASE MANAGEMENT
Notification of Maternity Inpatient Admission/Maternity Inpatient Authorization Request  This is a Notification of Maternity Inpatient Admission/Maternity Inpatient Authorization Request to our facility [facility]  Please be advised that this patient is currently in our facility under Inpatient Status  Below you will find the Birth/ Summary, Attending Physician and Facilitys information including NPI# and contact for the Utilization  assigned to the [facility] where the patient is receiving services  Please feel free to contact the Utilization Review Department with any questions  Mothers Information:  Maldonado Olivia  MRN: [id number]  YOB: 1980  Admission Date: 2018  3:46 PM  Discharge Date: 2018 12:50 PM  Disposition: Home/Self Care  Admitting Diagnosis: Encounter for full-term uncomplicated delivery [O75]   Information:  Estimated Date of Delivery: 18  Information for the patient's :  Arvind Foster Girl  Melia Blandon) [[id number]]      Delivery Information:  Sex: female  Delivered 2018 1:22 PM by Vaginal, Spontaneous Delivery; Gestational Age: 43w3d    [place] Measurements:  Weight: 4 lb 8 7 oz (2060 g); Height: 17 5"    APGAR 1 minute 5 minutes 10 minutes   Totals: 9 9      OB History      Para Term  AB Living    3 3 2 1 0 3    SAB TAB Ectopic Multiple Live Births    0 0 0 0 3        Attending Physician:  NIKIA Loja  Specialty- Obstetrics and Gynecology  [address] [id number]  [address]  Phone 1: [phone]  Fax: [phone]    [address] [place] [address]  [address]  [phone]  Tax ID: [id number]  NPI: [id number]    Thank you,  Keerthi Jiang  291 Utilization Review Department  Phone: [phone];  Fax [phone]  ATTENTION: The Network Utilization Review Department is now centralized for our 9 Facilities  Make a note that we have a new phone and fax numbers for our Department  Please call with any questions or concerns to [phone] and carefully follow the prompts so that you are directed to the right person  All voicemails are confidential  Fax any determinations, approvals, denials, and requests for initial or continue stay review clinical to [phone]  Due to HIGH CALL volume, it would be easier if you could please send faxed requests to expedite your requests and in part, help us provide discharge notifications faster

## 2018-08-16 NOTE — PROGRESS NOTES
Progress Note - OB/GYN   Nimisha Everett 41 y o  female MRN: [id number]  Unit/Bed#: -01 Encounter: [id number]    Assessment:  Post partum Day #2s/p     Plan:  1  Continue routine post partum care   Encourage ambulation   Patient does not plan to breastfeed   2  Birth control: patient would like a tubal ligation with a Depo bridge    3  Anemia: 8 6 on admission, 7 6 this morning s/p 6 rounds of Venifer: patient would likely benefit from anemia work-up    Subjective: Patient is doing well  She was asleep and had no complaints  She does not want to breast feed       Pain: yes, cramping, improved with meds  Tolerating PO: yes  Voiding: yes  Flatus: yes  BM: no   Ambulating: yes  Breastfeeding:  No, plans to formula feed   Chest pain: no  Shortness of breath: no  Leg pain: no  Lochia: minimal    Objective:     Vitals: BP 96/56   Pulse 62   Temp 97 9 °F (36 6 °C) (Oral)   Resp 16   Ht 5' (1 524 m)   Wt 49 9 kg (110 lb)   LMP 2017 (Exact Date)   SpO2 98%  BMI 21 48 kg/m²     No intake or output data in the 24 hours ending 18 0631    Lab Results   Component Value Date    WBC 13 81 (H) 08/15/2018    HGB 7 6 (L) 08/15/2018    HCT 23 3 (L) 08/15/2018    MCV 94 08/15/2018     08/15/2018       Physical Exam:   Gen: AAOx3, NAD  CV: RRR  Lungs: CTAB  Abd: Soft, non-tender, non-distended, no rebound or guarding  Uterine fundus firm and non-tender  Ext: Non tender, no swelling     Kiki Hernandez MD  2018  6:31 AM

## 2018-08-16 NOTE — PLAN OF CARE
DISCHARGE PLANNING     Discharge to home or other facility with appropriate resources Progressing        INFECTION - ADULT     Absence or prevention of progression during hospitalization Progressing     Absence of fever/infection during neutropenic period Progressing        Knowledge Deficit     Patient/family/caregiver demonstrates understanding of disease process, treatment plan, medications, and discharge instructions Progressing        PAIN - ADULT     Verbalizes/displays adequate comfort level or baseline comfort level Progressing        POSTPARTUM     Experiences normal postpartum course Progressing     Appropriate maternal -  bonding Progressing     Establishment of infant feeding pattern Progressing     Incision(s), wounds(s) or drain site(s) healing without S/S of infection Progressing        Potential for Falls     Patient will remain free of falls Progressing        SAFETY ADULT     Patient will remain free of falls Progressing     Maintain or return to baseline ADL function Progressing     Maintain or return mobility status to optimal level Progressing

## 2018-08-16 NOTE — DISCHARGE INSTRUCTIONS
Vaginal Delivery   WHAT YOU SHOULD KNOW:   A vaginal delivery is the birth of your baby through your vagina (birth canal)  AFTER YOU LEAVE:   Medicines:  · NSAIDs  help decrease swelling and pain or fever  This medicine is available with or without a doctor's order  NSAIDs can cause stomach bleeding or kidney problems in certain people  If you take blood thinner medicine, always ask your healthcare provider if NSAIDs are safe for you  Always read the medicine label and follow directions  · Take your medicine as directed  Call your healthcare provider if you think your medicine is not helping or if you have side effects  Tell him if you are allergic to any medicine  Keep a list of the medicines, vitamins, and herbs you take  Include the amounts, and when and why you take them  Bring the list or the pill bottles to follow-up visits  Carry your medicine list with you in case of an emergency  Follow up with your primary healthcare provider:  Most women need to return 6 weeks after a vaginal delivery  Ask about how to care for your wounds or stitches  Write down your questions so you remember to ask them during your visits  Activity:  Rest as much as possible  Try to keep all activities short  You may be able to do some exercise soon after you have your baby  Talk with your primary healthcare provider before you start exercising  If you work outside the home, ask when you can return to your job  Kegel exercises:  Kegel exercises may help your vaginal and rectal muscles heal faster  You can do Kegel exercises by tightening and relaxing the muscles around your vagina  Kegel exercises help make the muscles stronger  Breast care:  When your milk comes in, your breasts may feel full and hard  Ask how to care for your breasts, even if you are not breastfeeding  Constipation:  Do not try to push the bowel movement out if it is too hard   High-fiber foods, extra liquids, and regular exercise can help you prevent constipation  Examples of high-fiber foods are fruit and bran  Prune juice and water are good liquids to drink  Regular exercise helps your digestive system work  You may also be told to take over-the-counter fiber and stool softener medicines  Take these items as directed  Hemorrhoids:  Pregnancy can cause severe hemorrhoids  You may have rectal pain because of the hemorrhoids  Ask how to prevent or treat hemorrhoids  Perineum care: Your perineum is the area between your vagina and anus  Keep the area clean and dry to help it heal and to prevent infection  Wash the area gently with soap and water when you bathe or shower  Rinse your perineum with warm water when you use the toilet  Your primary healthcare provider may suggest you use a warm sitz bath to help decrease pain  A sitz bath is a bathtub or basin filled to hip level  Stay in the sitz bath for 20 to 30 minutes, or as directed  Vaginal discharge: You will have vaginal discharge, called lochia, after your delivery  The lochia is bright red the first day or two after the birth  By the fourth day, the amount decreases, and it turns red-brown  Use a sanitary pad rather than a tampon to prevent a vaginal infection  It is normal to have lochia up to 8 weeks after your baby is born  Monthly periods: Your period may start again within 7 to 12 weeks after your baby is born  If you are breastfeeding, it may take longer for your period to start again  You can still get pregnant again even though you do not have your monthly period  Talk with your primary healthcare provider about a birth control method that will be good for you if you do not want to get pregnant  Mood changes: Many new mothers have some kind of mood changes after delivery  Some of these changes occur because of lack of sleep, hormone changes, and caring for a new baby  Some mood changes can be more serious, such as postpartum depression   Talk with your primary healthcare provider if you feel unable to care for yourself or your baby  Sexual activity:  You may need to avoid sex for 6 to 7 weeks after you have your baby  You may notice you have a decreased desire for sex, or sex may be painful  You may need to use a vaginal lubricant (gel) to help make sex more comfortable  Contact your primary healthcare provider if:   · You have heavy vaginal bleeding that fills 1 or more sanitary pads in 1 hour  · You have a fever  · Your pain does not go away, or gets worse  · The skin between your vagina and rectum is swollen, warm, or red  · You have swollen, hard, or painful breasts  · You feel very sad or depressed  · You feel more tired than usual      · You have questions or concerns about your condition or care  Seek care immediately or call 911 if:   · You have pus or yellow drainage coming from your vagina or wound  · You are urinating very little, or not at all  · Your arm or leg feels warm, tender, and painful  It may look swollen and red  · You feel lightheaded, have sudden and worsening chest pain, or trouble breathing  You may have more pain when you take deep breaths or cough, or you may cough up blood  © 2014 [address] is for End User's use only and may not be sold, redistributed or otherwise used for commercial purposes  All illustrations and images included in CareNotes® are the copyrighted property of [employer] , [employer]  or [employer]  The above information is an  only  It is not intended as medical advice for individual conditions or treatments  Talk to your doctor, nurse or pharmacist before following any medical regimen to see if it is safe and effective for you

## 2018-08-20 LAB
DEPRECATED HGB OTHER BLD-IMP: 0 %
HGB A MFR BLD: 2.5 % (ref 1.8–3.2)
HGB A MFR BLD: 97.5 % (ref 96.4–98.8)
HGB C MFR BLD: 0 %
HGB F MFR BLD: 0 % (ref 0–2)
HGB FRACT BLD-IMP: NORMAL
HGB S BLD QL SOLY: NEGATIVE
HGB S MFR BLD: 0 %

## 2018-09-06 ENCOUNTER — TELEPHONE (OUTPATIENT)
Dept: OBGYN CLINIC | Facility: HOSPITAL | Age: 38
End: 2018-09-06

## 2018-09-06 NOTE — TELEPHONE ENCOUNTER
----- Message from Kathe Lennon RN sent at 2018 12:31 PM EDT -----  8/15/18   Needs post partum appt

## 2020-07-21 ENCOUNTER — AMB VIDEO VISIT (OUTPATIENT)
Dept: URGENT CARE | Facility: CLINIC | Age: 40
End: 2020-07-21

## 2020-07-21 DIAGNOSIS — K08.89 PAIN, DENTAL: Primary | ICD-10-CM

## 2020-07-21 RX ORDER — AMOXICILLIN 875 MG/1
875 TABLET, COATED ORAL 2 TIMES DAILY
Qty: 14 TABLET | Refills: 0 | Status: SHIPPED | OUTPATIENT
Start: 2020-07-21 | End: 2020-07-28

## 2020-07-21 NOTE — PROGRESS NOTES
Video Visit - John Maurice 47 y o  female MRN: [id number]    REQUIRED DOCUMENTATION:         1  [facility]  2  Provider located at [address] [place] [id number] [phone]   3  [employer] provider: Queen Ralph PA-C   4  Identify all parties in room with patient during [employer] visit:  none  5  After connecting through [employer], patient was identified by name and date of birth  Patient was then informed that this was a Telemedicine visit and that the exam was being conducted confidentially over secure lines  My office door was closed  No one else was in the room  Patient acknowledged consent and understanding of privacy and security of the Telemedicine visit  I informed the patient that I have reviewed their record in Epic and presented the opportunity for them to ask any questions regarding the visit today  The patient agreed to participate  82-year-old female with dental pain  Has had problems with right lower tooth, molar for several weeks  She was on an antibiotic a couple weeks ago and was scheduled to have tooth extracted but appointment was canceled  She has her next tennis appointment on July 27th  Over the last 2 days she has developed a lump and increased pain right lower jaw  No fever or chills but headache and just does not feel well  Hurts to chew down  She has been taking ibuprofen 800 mg  Tried to contact her dentist but dentist would not prescribe antibiotic until she was seen  Patient states she was treated with amoxicillin in the past   She lists penicillin as an allergy but states that she was given that and Vicodin when she had wisdom tooth extraction  After taking she was unable to void  Physical Exam   Constitutional: She is oriented to person, place, and time  She appears well-developed and well-nourished  No distress     HENT:   Slight swelling visible along right mandibular region  On patient's self palpation she states that she feels a tender lump along the jaw line  When patient pulls her cheek back provider can see darkened tooth right lower molar region  Pulmonary/Chest: Effort normal  No respiratory distress  Neurological: She is alert and oriented to person, place, and time  Skin: She is not diaphoretic  Psychiatric: She has a normal mood and affect  Feliciano Eldridge was seen today for dental pain  Diagnoses and all orders for this visit:    Pain, dental  -     amoxicillin (AMOXIL) 875 mg tablet; Take 1 tablet (875 mg total) by mouth 2 (two) times a day for 7 days      Patient Instructions   Take antibiotic as instructed  May continue your ibuprofen as needed  Warm or cold compresses against the face may be soothing  Follow-up with your dentist at the next scheduled appointment  If significant worsening of pain, proceed to emergency room for further evaluation  Follow up with PCP if not improved, if symptoms are worse, go to the ER

## 2020-07-21 NOTE — CARE ANYWHERE EVISITS
Visit Summary for [facility] - Gender: Female - Date of Birth: [id number]  Date: [id number] - Duration: 7 minutes  Patient: [facility]  Provider: Мария Ramirez PA-C    Patient Contact Information  Address  [address] [place] [address] [place] [address]  [phone]    Visit Topics  tooth infection pain, dizziness, sore jaw [Added By: Self - 2020-07-21]    Triage Questions   What is your current physical address in the event of a medical emergency? Answer []  Are you allergic to any medications? Answer []  Are you now or could you be pregnant? Answer []  Do you have any immune system compromise or chronic lung   disease? Answer []  Do you have any vulnerable family members in the home (infant, pregnant, cancer, elderly)? Answer []     Conversation Transcripts  [0A][0A] [Notification] You are connected with Мария Ramirez PA-C, Urgent Care Specialist [0A][Notification] Marcia Larios is located in [place]  [0A][Notification] Marcia Larios has shared health history  Dara Soulier  [0A]    Diagnosis    Procedures  Value: [id number] Code: CPT-4 UNLISTED E&M SERVICE    Medications Prescribed    No prescriptions ordered    Electronically signed by: Patricia Sharma(NPI [id number])

## 2020-07-21 NOTE — PATIENT INSTRUCTIONS
Take antibiotic as instructed  May continue your ibuprofen as needed  Warm or cold compresses against the face may be soothing  Follow-up with your dentist at the next scheduled appointment  If significant worsening of pain, proceed to emergency room for further evaluation

## 2022-06-24 ENCOUNTER — OFFICE VISIT (OUTPATIENT)
Dept: URGENT CARE | Age: 42
End: 2022-06-24
Payer: COMMERCIAL

## 2022-06-24 VITALS
SYSTOLIC BLOOD PRESSURE: 121 MMHG | HEART RATE: 75 BPM | OXYGEN SATURATION: 99 % | TEMPERATURE: 98 F | RESPIRATION RATE: 18 BRPM | DIASTOLIC BLOOD PRESSURE: 62 MMHG

## 2022-06-24 DIAGNOSIS — H10.31 ACUTE BACTERIAL CONJUNCTIVITIS OF RIGHT EYE: Primary | ICD-10-CM

## 2022-06-24 DIAGNOSIS — J06.9 URI WITH COUGH AND CONGESTION: ICD-10-CM

## 2022-06-24 PROCEDURE — 99213 OFFICE O/P EST LOW 20 MIN: CPT

## 2022-06-24 RX ORDER — BROMPHENIRAMINE MALEATE, PSEUDOEPHEDRINE HYDROCHLORIDE, AND DEXTROMETHORPHAN HYDROBROMIDE 2; 30; 10 MG/5ML; MG/5ML; MG/5ML
10 SYRUP ORAL 4 TIMES DAILY PRN
Qty: 120 ML | Refills: 0 | Status: SHIPPED | OUTPATIENT
Start: 2022-06-24

## 2022-06-24 RX ORDER — PROPRANOLOL HYDROCHLORIDE 10 MG/1
TABLET ORAL
COMMUNITY
Start: 2022-05-13

## 2022-06-24 RX ORDER — OFLOXACIN 3 MG/ML
1 SOLUTION/ DROPS OPHTHALMIC 4 TIMES DAILY
Qty: 5 ML | Refills: 0 | Status: SHIPPED | OUTPATIENT
Start: 2022-06-24

## 2022-06-24 NOTE — PROGRESS NOTES
[facility] [place]        NAME: Matheus Alba is a 38 y o  female  : 1980    MRN: [id number]  DATE: 2022  TIME: 10:14 AM    Assessment and Plan   Acute bacterial conjunctivitis of right eye [H10 31]  1  Acute bacterial conjunctivitis of right eye  ofloxacin (OCUFLOX) 0 3 % ophthalmic solution   2  URI with cough and congestion  brompheniramine-pseudoephedrine-DM 30-2-10 MG/5ML syrup         Patient Instructions     Bromfed as needed for cough and congestion  Ocuflox 4 times daily for 7 days  Follow up with PCP in 3-5 days  Proceed to  ER if symptoms worsen  Chief Complaint     Chief Complaint   Patient presents with    Nasal Congestion     Congestion and sinus pressure for a week    Eye Pain     Right eye irritation and crusting         History of Present Illness       Patient presenting for evaluation of cough, congestion and sore throat over the past 5 days  She states her cough is nonproductive  She describes her nasal drainage as yellow and green  She denies any recent fevers, chills, chest pain and shortness of breath  Patient is vaccinated against COVID, and took a home COVID test with negative test results  Patient states that she did test positive for COVID approximately 2 weeks ago  She also complains of right eye drainage  Patient states that she woke up this morning with this eye drainage  She describes as crusty and  cheezy and that her eye was glued shut this morning  Patient states that she has been using Zyrtec and Flonase for her symptoms, that is providing mild relief  Review of Systems   Review of Systems   Constitutional: Negative for chills and fever  HENT: Positive for congestion and sore throat  Negative for ear pain, sinus pressure and sinus pain  Eyes: Positive for discharge  Negative for pain and visual disturbance  Respiratory: Positive for cough  Negative for choking and shortness of breath      Cardiovascular: Negative for chest pain and palpitations  Gastrointestinal: Negative for abdominal pain and vomiting  Genitourinary: Negative for dysuria and hematuria  Musculoskeletal: Negative for arthralgias and back pain  Skin: Negative for color change and rash  Neurological: Negative for seizures and syncope  All other systems reviewed and are negative  Current Medications       Current Outpatient Medications:     brompheniramine-pseudoephedrine-DM 30-2-10 MG/5ML syrup, Take 10 mL by mouth 4 (four) times a day as needed for congestion, cough or allergies, Disp: 120 mL, Rfl: 0    ofloxacin (OCUFLOX) 0 3 % ophthalmic solution, Administer 1 drop to the right eye 4 (four) times a day, Disp: 5 mL, Rfl: 0    acetaminophen (TYLENOL) 325 mg tablet, Take 1-2 tablets every 4-6 hours as needed  , Disp: 30 tablet, Rfl: 0    docusate sodium (COLACE) 100 mg capsule, Take 1 capsule (100 mg total) by mouth 2 (two) times a day, Disp: 10 capsule, Rfl: 0    ibuprofen (MOTRIN) 600 mg tablet, Take 1 tablet (600 mg total) by mouth every 6 (six) hours as needed for mild pain, Disp: 30 tablet, Rfl: 0    Prenatal MV-Min-Fe Fum-FA-DHA (PRENATAL 1) 30-0 975-200 MG CAPS, Take 1 capsule by mouth daily, Disp: 1 capsule, Rfl: 2    propranolol (INDERAL) 10 mg tablet, TAKE 1 TABLET BY MOUTH TWO TIMES DAILY AS NEEDED FOR ANXIETY, Disp: , Rfl:     sertraline (ZOLOFT) 50 mg tablet, Take 50 mg by mouth daily, Disp: , Rfl:     Current Allergies     Allergies as of 06/24/2022 - Reviewed 06/24/2022   Allergen Reaction Noted    Penicillins Other (See Comments) 01/31/2018    Vicodin [hydrocodone-acetaminophen] Other (See Comments) 01/31/2018            The following portions of the patient's history were reviewed and updated as appropriate: allergies, current medications, past family history, past medical history, past social history, past surgical history and problem list      Past Medical History:   Diagnosis Date    Abnormal Pap smear of cervix     Anxiety     Depression     hx of post partum depression    Postpartum depression     Urinary tract infection     x1 in past    Varicella     asa child       Past Surgical History:   Procedure Laterality Date    CERVICAL BIOPSY  W/ LOOP ELECTRODE EXCISION      1999    COLPOSCOPY      DENTAL SURGERY         Family History   Problem Relation Age of Onset    Breast cancer Family         Aunt         Medications have been verified  Objective   /62 (BP Location: Left arm, Patient Position: Sitting, Cuff Size: Standard)   Pulse 75   Temp 98 °F (36 7 °C) (Temporal)   Resp 18   SpO2 99%        Physical Exam     Physical Exam  Vitals and nursing note reviewed  Constitutional:       General: She is not in acute distress  Appearance: Normal appearance  She is normal weight  She is not ill-appearing, toxic-appearing or diaphoretic  HENT:      Head: Normocephalic and atraumatic  Right Ear: Tympanic membrane normal       Left Ear: Tympanic membrane normal       Nose: Congestion present  No rhinorrhea  Mouth/Throat:      Mouth: Mucous membranes are moist       Pharynx: Oropharynx is clear  Posterior oropharyngeal erythema present  No oropharyngeal exudate  Eyes:      General:         Right eye: Discharge (Mucopurulent) present  Left eye: No discharge  Extraocular Movements: Extraocular movements intact  Conjunctiva/sclera:      Right eye: Right conjunctiva is injected  Pupils: Pupils are equal, round, and reactive to light  Cardiovascular:      Rate and Rhythm: Normal rate and regular rhythm  Pulses: Normal pulses  Heart sounds: Normal heart sounds  No murmur heard  No friction rub  No gallop  Pulmonary:      Effort: Pulmonary effort is normal  No respiratory distress  Breath sounds: Normal breath sounds  No stridor  No wheezing, rhonchi or rales  Chest:      Chest wall: No tenderness  Abdominal:      General: Abdomen is flat   Bowel sounds are normal  Palpations: Abdomen is soft  Tenderness: There is no abdominal tenderness  There is no guarding or rebound  Musculoskeletal:         General: No tenderness  Normal range of motion  Cervical back: Normal range of motion and neck supple  No tenderness  Skin:     General: Skin is warm and dry  Capillary Refill: Capillary refill takes less than 2 seconds  Neurological:      General: No focal deficit present  Mental Status: She is alert and oriented to person, place, and time     Psychiatric:         Mood and Affect: Mood normal          Behavior: Behavior normal

## 2022-06-24 NOTE — LETTER
June 24, 2022     Patient: Anthony Mcdonald   YOB: 1980   Date of Visit: 6/24/2022       To Whom it May Concern:    Anthony Mcdonald was seen in my clinic on 6/24/2022  She may return to work on 6/27/2022  If you have any questions or concerns, please don't hesitate to call           Sincerely,          SINA Torres        CC: No Recipients

## 2022-06-24 NOTE — PATIENT INSTRUCTIONS
- Please use antibiotic eye drops 4 times daily for the next 7 days   - Please take Bromfed every 6 hours as needed for cough and congestion

## 2022-11-07 ENCOUNTER — OFFICE VISIT (OUTPATIENT)
Dept: URGENT CARE | Age: 42
End: 2022-11-07

## 2022-11-07 VITALS — TEMPERATURE: 97.8 F | HEART RATE: 101 BPM | RESPIRATION RATE: 16 BRPM | OXYGEN SATURATION: 99 %

## 2022-11-07 DIAGNOSIS — R50.9 FEVER, UNSPECIFIED FEVER CAUSE: Primary | ICD-10-CM

## 2022-11-07 DIAGNOSIS — J06.9 ACUTE URI: ICD-10-CM

## 2022-11-07 RX ORDER — ALBUTEROL SULFATE 90 UG/1
2 AEROSOL, METERED RESPIRATORY (INHALATION) EVERY 6 HOURS PRN
Qty: 8.5 G | Refills: 0 | Status: SHIPPED | OUTPATIENT
Start: 2022-11-07

## 2022-11-07 RX ORDER — BENZONATATE 200 MG/1
200 CAPSULE ORAL 3 TIMES DAILY PRN
Qty: 20 CAPSULE | Refills: 0 | Status: SHIPPED | OUTPATIENT
Start: 2022-11-07

## 2022-11-07 NOTE — LETTER
November 7, 2022     Patient: Anthony Mcdonald   YOB: 1980   Date of Visit: 11/7/2022       To Whom it May Concern:    Anthony Mcdonald was seen in my clinic on 11/7/2022  She return to work when her [place] and flu test results  If her COVID test is positive, she must quarantine for a total of 7 days from symptom onset  If her flu test is positive, she may return to work when she is fever free for 24 hours without fever reducing medication  If you have any questions or concerns, please don't hesitate to call           Sincerely,          SINA Torres        CC: No Recipients

## 2022-11-07 NOTE — PATIENT INSTRUCTIONS
Please take Tessalon every 8 hours as needed for cough  Please take albuterol inhaler every 6 hours as needed for cough and wheezing  If symptoms persist, please follow-up with your primary care provider

## 2022-11-07 NOTE — PROGRESS NOTES
[facility]        NAME: Nhan Palmer is a 38 y o  female  : 1980    MRN: [id number]  DATE: 2022  TIME: 2:15 PM    Assessment and Plan   Fever, unspecified fever cause [R50 9]  1  Fever, unspecified fever cause  Cov/Flu-Collected at [facility] or [facility]   2  Acute URI  albuterol (ProAir HFA) 90 mcg/act inhaler    benzonatate (TESSALON) 200 MG capsule     Pending COVID and flu PCR  Discussed quarantine guidelines with patient  Discussed using albuterol inhaler and Tessalon as needed for cough and chest tightness  Patient agrees with this plan of care, all questions and concerns were addressed during this visit  Patient Instructions     Albuterol and Tessalon as needed  Quarantine as discussed  Follow up with PCP in 3-5 days  Proceed to  ER if symptoms worsen  Chief Complaint     Chief Complaint   Patient presents with   • Cough     Productive cough, fever, body aches, nasal congestion, chest congestion, since Wed, OTC meds not help         History of Present Illness       Patient presenting for evaluation of cough, congestion and fever  Patient states that her cough is nonproductive  He is her T-max was 100 2°  She states that she is been taking Sudafed, cough and cold medication and nasal spray for symptoms with minimal relief  She took a home COVID test and had negative test results  She denies any chest pain or shortness of breath at this time  Patient states that she is not vaccinated at [facility], but states that she works at a  and has multiple sick contacts  Review of Systems   Review of Systems   Constitutional: Positive for fever  Negative for chills  HENT: Positive for congestion  Negative for ear pain and sore throat  Eyes: Negative for pain and visual disturbance  Respiratory: Positive for cough  Negative for shortness of breath  Cardiovascular: Negative for chest pain and palpitations     Gastrointestinal: Negative for abdominal pain and vomiting  Genitourinary: Negative for dysuria and hematuria  Musculoskeletal: Positive for myalgias  Negative for arthralgias and back pain  Skin: Negative for color change and rash  Neurological: Negative for seizures and syncope  All other systems reviewed and are negative  Current Medications       Current Outpatient Medications:   •  albuterol (ProAir HFA) 90 mcg/act inhaler, Inhale 2 puffs every 6 (six) hours as needed for wheezing, Disp: 8 5 g, Rfl: 0  •  benzonatate (TESSALON) 200 MG capsule, Take 1 capsule (200 mg total) by mouth 3 (three) times a day as needed for cough, Disp: 20 capsule, Rfl: 0  •  brompheniramine-pseudoephedrine-DM 30-2-10 MG/5ML syrup, Take 10 mL by mouth 4 (four) times a day as needed for congestion, cough or allergies, Disp: 120 mL, Rfl: 0  •  sertraline (ZOLOFT) 50 mg tablet, Take 50 mg by mouth daily, Disp: , Rfl:   •  acetaminophen (TYLENOL) 325 mg tablet, Take 1-2 tablets every 4-6 hours as needed   (Patient not taking: Reported on 11/7/2022), Disp: 30 tablet, Rfl: 0  •  docusate sodium (COLACE) 100 mg capsule, Take 1 capsule (100 mg total) by mouth 2 (two) times a day (Patient not taking: Reported on 11/7/2022), Disp: 10 capsule, Rfl: 0  •  ibuprofen (MOTRIN) 600 mg tablet, Take 1 tablet (600 mg total) by mouth every 6 (six) hours as needed for mild pain (Patient not taking: Reported on 11/7/2022), Disp: 30 tablet, Rfl: 0  •  ofloxacin (OCUFLOX) 0 3 % ophthalmic solution, Administer 1 drop to the right eye 4 (four) times a day (Patient not taking: Reported on 11/7/2022), Disp: 5 mL, Rfl: 0  •  Prenatal MV-Min-Fe Fum-FA-DHA (PRENATAL 1) 30-0 975-200 MG CAPS, Take 1 capsule by mouth daily (Patient not taking: Reported on 11/7/2022), Disp: 1 capsule, Rfl: 2  •  propranolol (INDERAL) 10 mg tablet, TAKE 1 TABLET BY MOUTH TWO TIMES DAILY AS NEEDED FOR ANXIETY (Patient not taking: Reported on 11/7/2022), Disp: , Rfl:     Current Allergies     Allergies as of 11/07/2022 - Reviewed 11/07/2022   Allergen Reaction Noted   • Penicillins Other (See Comments) 01/31/2018   • Vicodin [hydrocodone-acetaminophen] Other (See Comments) 01/31/2018            The following portions of the patient's history were reviewed and updated as appropriate: allergies, current medications, past family history, past medical history, past social history, past surgical history and problem list      Past Medical History:   Diagnosis Date   • Abnormal Pap smear of cervix    • Anxiety    • Depression     hx of post partum depression   • Postpartum depression    • Urinary tract infection     x1 in past   • Varicella     asa child       Past Surgical History:   Procedure Laterality Date   • CERVICAL BIOPSY  W/ LOOP ELECTRODE EXCISION      1999   • COLPOSCOPY     • DENTAL SURGERY         Family History   Problem Relation Age of Onset   • Breast cancer Family         Aunt         Medications have been verified  Objective   Pulse 101   Temp 97 8 °F (36 6 °C)   Resp 16   SpO2 99%        Physical Exam     Physical Exam  Vitals and nursing note reviewed  Constitutional:       General: She is not in acute distress  Appearance: Normal appearance  She is normal weight  She is not ill-appearing, toxic-appearing or diaphoretic  HENT:      Head: Normocephalic and atraumatic  Right Ear: Tympanic membrane normal       Left Ear: Tympanic membrane normal       Nose: Congestion present  No rhinorrhea  Mouth/Throat:      Mouth: Mucous membranes are moist       Pharynx: Oropharynx is clear  No oropharyngeal exudate or posterior oropharyngeal erythema  Eyes:      General:         Right eye: No discharge  Left eye: No discharge  Extraocular Movements: Extraocular movements intact  Conjunctiva/sclera: Conjunctivae normal       Pupils: Pupils are equal, round, and reactive to light  Cardiovascular:      Rate and Rhythm: Normal rate and regular rhythm        Pulses: Normal pulses  Heart sounds: Normal heart sounds  No murmur heard  No friction rub  No gallop  Pulmonary:      Effort: Pulmonary effort is normal  No respiratory distress  Breath sounds: Normal breath sounds  No stridor  No wheezing, rhonchi or rales  Chest:      Chest wall: No tenderness  Abdominal:      General: Abdomen is flat  Bowel sounds are normal       Palpations: Abdomen is soft  Tenderness: There is no abdominal tenderness  There is no guarding or rebound  Musculoskeletal:         General: No tenderness  Normal range of motion  Cervical back: Normal range of motion and neck supple  No tenderness  Skin:     General: Skin is warm and dry  Capillary Refill: Capillary refill takes less than 2 seconds  Neurological:      General: No focal deficit present  Mental Status: She is alert and oriented to person, place, and time     Psychiatric:         Mood and Affect: Mood normal          Behavior: Behavior normal

## 2022-11-08 LAB
FLUAV RNA RESP QL NAA+PROBE: NEGATIVE
FLUBV RNA RESP QL NAA+PROBE: NEGATIVE
SARS-COV-2 RNA RESP QL NAA+PROBE: NEGATIVE